# Patient Record
Sex: FEMALE | Race: BLACK OR AFRICAN AMERICAN | NOT HISPANIC OR LATINO
[De-identification: names, ages, dates, MRNs, and addresses within clinical notes are randomized per-mention and may not be internally consistent; named-entity substitution may affect disease eponyms.]

---

## 2018-08-15 ENCOUNTER — NON-APPOINTMENT (OUTPATIENT)
Age: 33
End: 2018-08-15

## 2018-08-15 ENCOUNTER — APPOINTMENT (OUTPATIENT)
Dept: OBGYN | Facility: CLINIC | Age: 33
End: 2018-08-15
Payer: MEDICAID

## 2018-08-15 VITALS
BODY MASS INDEX: 43.57 KG/M2 | HEART RATE: 85 BPM | HEIGHT: 64 IN | WEIGHT: 255.19 LBS | DIASTOLIC BLOOD PRESSURE: 92 MMHG | SYSTOLIC BLOOD PRESSURE: 135 MMHG

## 2018-08-15 DIAGNOSIS — Z86.69 PERSONAL HISTORY OF OTHER DISEASES OF THE NERVOUS SYSTEM AND SENSE ORGANS: ICD-10-CM

## 2018-08-15 DIAGNOSIS — J45.909 UNSPECIFIED ASTHMA, UNCOMPLICATED: ICD-10-CM

## 2018-08-15 DIAGNOSIS — M25.472 EFFUSION, RIGHT ANKLE: ICD-10-CM

## 2018-08-15 DIAGNOSIS — Z82.0 FAMILY HISTORY OF EPILEPSY AND OTHER DISEASES OF THE NERVOUS SYSTEM: ICD-10-CM

## 2018-08-15 DIAGNOSIS — M25.471 EFFUSION, RIGHT ANKLE: ICD-10-CM

## 2018-08-15 PROCEDURE — 0501F PRENATAL FLOW SHEET: CPT

## 2018-08-15 RX ORDER — PRENATAL VIT NO.130/IRON/FOLIC 27MG-0.8MG
28-0.8 TABLET ORAL DAILY
Qty: 90 | Refills: 2 | Status: DISCONTINUED | COMMUNITY
Start: 2018-08-15 | End: 2018-08-15

## 2018-08-16 LAB
ABO + RH PNL BLD: NORMAL
B19V IGG SER QL IA: 8.1 INDEX
B19V IGG+IGM SER-IMP: NORMAL
B19V IGG+IGM SER-IMP: POSITIVE
B19V IGM FLD-ACNC: 0.2 INDEX
B19V IGM SER-ACNC: NEGATIVE
BASOPHILS # BLD AUTO: 0.01 K/UL
BASOPHILS NFR BLD AUTO: 0.1 %
BLD GP AB SCN SERPL QL: NORMAL
C TRACH RRNA SPEC QL NAA+PROBE: NOT DETECTED
EOSINOPHIL # BLD AUTO: 0.15 K/UL
EOSINOPHIL NFR BLD AUTO: 1.8 %
HBA1C MFR BLD HPLC: 5.6 %
HBV SURFACE AG SER QL: NONREACTIVE
HCG SERPL-MCNC: 8348 MIU/ML
HCT VFR BLD CALC: 38 %
HGB BLD-MCNC: 11.7 G/DL
HIV1+2 AB SPEC QL IA.RAPID: NONREACTIVE
HPV HIGH+LOW RISK DNA PNL CVX: NOT DETECTED
IMM GRANULOCYTES NFR BLD AUTO: 0.4 %
LYMPHOCYTES # BLD AUTO: 2.28 K/UL
LYMPHOCYTES NFR BLD AUTO: 27.5 %
MAN DIFF?: NORMAL
MCHC RBC-ENTMCNC: 25.1 PG
MCHC RBC-ENTMCNC: 30.8 GM/DL
MCV RBC AUTO: 81.4 FL
MONOCYTES # BLD AUTO: 0.47 K/UL
MONOCYTES NFR BLD AUTO: 5.7 %
N GONORRHOEA RRNA SPEC QL NAA+PROBE: NOT DETECTED
NEUTROPHILS # BLD AUTO: 5.35 K/UL
NEUTROPHILS NFR BLD AUTO: 64.5 %
PLATELET # BLD AUTO: 363 K/UL
RBC # BLD: 4.67 M/UL
RBC # FLD: 17.8 %
SOURCE AMPLIFICATION: NORMAL
WBC # FLD AUTO: 8.29 K/UL

## 2018-08-17 ENCOUNTER — TRANSCRIPTION ENCOUNTER (OUTPATIENT)
Age: 33
End: 2018-08-17

## 2018-08-17 LAB
BACTERIA UR CULT: NORMAL
HGB A MFR BLD: 97.5 %
HGB A2 MFR BLD: 2.5 %
HGB FRACT BLD-IMP: NORMAL
MEV IGG FLD QL IA: 69.9 AU/ML
MEV IGG+IGM SER-IMP: POSITIVE
MUV AB SER-ACNC: POSITIVE
MUV IGG SER QL IA: 195 AU/ML
RUBV IGG FLD-ACNC: 6.1 INDEX
RUBV IGG SER-IMP: POSITIVE
T PALLIDUM AB SER QL IA: NEGATIVE
VZV AB TITR SER: POSITIVE
VZV IGG SER IF-ACNC: 557.6 INDEX

## 2018-08-20 LAB
CYTOLOGY CVX/VAG DOC THIN PREP: NORMAL
M TB IFN-G BLD-IMP: NEGATIVE
QUANTIFERON TB PLUS MITOGEN MINUS NIL: >10 IU/ML
QUANTIFERON TB PLUS NIL: 0.01 IU/ML
QUANTIFERON TB PLUS TB1 MINUS NIL: 0 IU/ML
QUANTIFERON TB PLUS TB2 MINUS NIL: 0 IU/ML

## 2018-08-22 ENCOUNTER — NON-APPOINTMENT (OUTPATIENT)
Age: 33
End: 2018-08-22

## 2018-08-22 ENCOUNTER — APPOINTMENT (OUTPATIENT)
Dept: OBGYN | Facility: CLINIC | Age: 33
End: 2018-08-22
Payer: MEDICAID

## 2018-08-22 VITALS
HEART RATE: 82 BPM | DIASTOLIC BLOOD PRESSURE: 92 MMHG | WEIGHT: 254.25 LBS | SYSTOLIC BLOOD PRESSURE: 149 MMHG | BODY MASS INDEX: 43.64 KG/M2

## 2018-08-22 LAB — FMR1 GENE MUT ANL BLD/T: NORMAL

## 2018-08-22 PROCEDURE — 0502F SUBSEQUENT PRENATAL CARE: CPT

## 2018-08-23 LAB
AR GENE MUT ANL BLD/T: NEGATIVE
CFTR MUT TESTED BLD/T: NEGATIVE

## 2018-08-24 ENCOUNTER — APPOINTMENT (OUTPATIENT)
Dept: ANTEPARTUM | Facility: CLINIC | Age: 33
End: 2018-08-24

## 2018-08-24 ENCOUNTER — APPOINTMENT (OUTPATIENT)
Dept: MATERNAL FETAL MEDICINE | Facility: CLINIC | Age: 33
End: 2018-08-24
Payer: MEDICAID

## 2018-08-24 ENCOUNTER — ASOB RESULT (OUTPATIENT)
Age: 33
End: 2018-08-24

## 2018-08-24 PROCEDURE — 99214 OFFICE O/P EST MOD 30 MIN: CPT

## 2018-08-29 ENCOUNTER — APPOINTMENT (OUTPATIENT)
Dept: OBGYN | Facility: CLINIC | Age: 33
End: 2018-08-29
Payer: MEDICAID

## 2018-08-29 ENCOUNTER — ASOB RESULT (OUTPATIENT)
Age: 33
End: 2018-08-29

## 2018-08-29 ENCOUNTER — NON-APPOINTMENT (OUTPATIENT)
Age: 33
End: 2018-08-29

## 2018-08-29 ENCOUNTER — APPOINTMENT (OUTPATIENT)
Dept: ANTEPARTUM | Facility: CLINIC | Age: 33
End: 2018-08-29
Payer: MEDICAID

## 2018-08-29 VITALS
SYSTOLIC BLOOD PRESSURE: 140 MMHG | HEIGHT: 64 IN | WEIGHT: 255 LBS | DIASTOLIC BLOOD PRESSURE: 92 MMHG | HEART RATE: 81 BPM | BODY MASS INDEX: 43.54 KG/M2

## 2018-08-29 DIAGNOSIS — Z3A.01 LESS THAN 8 WEEKS GESTATION OF PREGNANCY: ICD-10-CM

## 2018-08-29 DIAGNOSIS — Z3A.08 8 WEEKS GESTATION OF PREGNANCY: ICD-10-CM

## 2018-08-29 PROCEDURE — 76801 OB US < 14 WKS SINGLE FETUS: CPT

## 2018-08-29 PROCEDURE — 76817 TRANSVAGINAL US OBSTETRIC: CPT

## 2018-08-29 PROCEDURE — 0502F SUBSEQUENT PRENATAL CARE: CPT

## 2018-08-30 ENCOUNTER — APPOINTMENT (OUTPATIENT)
Dept: OBGYN | Facility: CLINIC | Age: 33
End: 2018-08-30
Payer: MEDICAID

## 2018-08-30 VITALS
SYSTOLIC BLOOD PRESSURE: 141 MMHG | WEIGHT: 253.13 LBS | HEART RATE: 74 BPM | DIASTOLIC BLOOD PRESSURE: 96 MMHG | BODY MASS INDEX: 43.45 KG/M2

## 2018-08-30 PROCEDURE — 99213 OFFICE O/P EST LOW 20 MIN: CPT

## 2018-09-03 ENCOUNTER — TRANSCRIPTION ENCOUNTER (OUTPATIENT)
Age: 33
End: 2018-09-03

## 2018-09-04 ENCOUNTER — APPOINTMENT (OUTPATIENT)
Dept: OBGYN | Facility: HOSPITAL | Age: 33
End: 2018-09-04

## 2018-09-04 ENCOUNTER — RESULT REVIEW (OUTPATIENT)
Age: 33
End: 2018-09-04

## 2018-09-04 ENCOUNTER — OUTPATIENT (OUTPATIENT)
Dept: OUTPATIENT SERVICES | Facility: HOSPITAL | Age: 33
LOS: 1 days | End: 2018-09-04
Payer: MEDICAID

## 2018-09-04 VITALS
HEIGHT: 64 IN | HEART RATE: 69 BPM | DIASTOLIC BLOOD PRESSURE: 86 MMHG | OXYGEN SATURATION: 100 % | TEMPERATURE: 97 F | WEIGHT: 253.09 LBS | SYSTOLIC BLOOD PRESSURE: 148 MMHG | RESPIRATION RATE: 18 BRPM

## 2018-09-04 VITALS
OXYGEN SATURATION: 98 % | RESPIRATION RATE: 18 BRPM | TEMPERATURE: 97 F | DIASTOLIC BLOOD PRESSURE: 88 MMHG | SYSTOLIC BLOOD PRESSURE: 132 MMHG | HEART RATE: 82 BPM

## 2018-09-04 VITALS
SYSTOLIC BLOOD PRESSURE: 140 MMHG | RESPIRATION RATE: 16 BRPM | HEART RATE: 80 BPM | TEMPERATURE: 99 F | DIASTOLIC BLOOD PRESSURE: 94 MMHG

## 2018-09-04 DIAGNOSIS — Z01.818 ENCOUNTER FOR OTHER PREPROCEDURAL EXAMINATION: ICD-10-CM

## 2018-09-04 DIAGNOSIS — O02.1 MISSED ABORTION: ICD-10-CM

## 2018-09-04 DIAGNOSIS — Z29.9 ENCOUNTER FOR PROPHYLACTIC MEASURES, UNSPECIFIED: ICD-10-CM

## 2018-09-04 DIAGNOSIS — Z98.891 HISTORY OF UTERINE SCAR FROM PREVIOUS SURGERY: Chronic | ICD-10-CM

## 2018-09-04 DIAGNOSIS — Z87.39 PERSONAL HISTORY OF OTHER DISEASES OF THE MUSCULOSKELETAL SYSTEM AND CONNECTIVE TISSUE: Chronic | ICD-10-CM

## 2018-09-04 DIAGNOSIS — I10 ESSENTIAL (PRIMARY) HYPERTENSION: ICD-10-CM

## 2018-09-04 DIAGNOSIS — Z98.890 OTHER SPECIFIED POSTPROCEDURAL STATES: Chronic | ICD-10-CM

## 2018-09-04 LAB
ANION GAP SERPL CALC-SCNC: 10 MMOL/L — SIGNIFICANT CHANGE UP (ref 5–17)
BASOPHILS # BLD AUTO: 0 K/UL — SIGNIFICANT CHANGE UP (ref 0–0.2)
BASOPHILS NFR BLD AUTO: 0.2 % — SIGNIFICANT CHANGE UP (ref 0–2)
BLD GP AB SCN SERPL QL: SIGNIFICANT CHANGE UP
BUN SERPL-MCNC: 8 MG/DL — SIGNIFICANT CHANGE UP (ref 8–20)
CALCIUM SERPL-MCNC: 9 MG/DL — SIGNIFICANT CHANGE UP (ref 8.6–10.2)
CHLORIDE SERPL-SCNC: 102 MMOL/L — SIGNIFICANT CHANGE UP (ref 98–107)
CO2 SERPL-SCNC: 27 MMOL/L — SIGNIFICANT CHANGE UP (ref 22–29)
CREAT SERPL-MCNC: 0.74 MG/DL — SIGNIFICANT CHANGE UP (ref 0.5–1.3)
EOSINOPHIL # BLD AUTO: 0.2 K/UL — SIGNIFICANT CHANGE UP (ref 0–0.5)
EOSINOPHIL NFR BLD AUTO: 2.7 % — SIGNIFICANT CHANGE UP (ref 0–6)
GLUCOSE SERPL-MCNC: 87 MG/DL — SIGNIFICANT CHANGE UP (ref 70–115)
HCG SERPL-ACNC: 2098 MIU/ML — SIGNIFICANT CHANGE UP
HCT VFR BLD CALC: 36.2 % — LOW (ref 37–47)
HGB BLD-MCNC: 11.7 G/DL — LOW (ref 12–16)
LYMPHOCYTES # BLD AUTO: 1.7 K/UL — SIGNIFICANT CHANGE UP (ref 1–4.8)
LYMPHOCYTES # BLD AUTO: 30.4 % — SIGNIFICANT CHANGE UP (ref 20–55)
MCHC RBC-ENTMCNC: 25.9 PG — LOW (ref 27–31)
MCHC RBC-ENTMCNC: 32.3 G/DL — SIGNIFICANT CHANGE UP (ref 32–36)
MCV RBC AUTO: 80.3 FL — LOW (ref 81–99)
MONOCYTES # BLD AUTO: 0.4 K/UL — SIGNIFICANT CHANGE UP (ref 0–0.8)
MONOCYTES NFR BLD AUTO: 6.6 % — SIGNIFICANT CHANGE UP (ref 3–10)
NEUTROPHILS # BLD AUTO: 3.3 K/UL — SIGNIFICANT CHANGE UP (ref 1.8–8)
NEUTROPHILS NFR BLD AUTO: 59.9 % — SIGNIFICANT CHANGE UP (ref 37–73)
PLATELET # BLD AUTO: 281 K/UL — SIGNIFICANT CHANGE UP (ref 150–400)
POTASSIUM SERPL-MCNC: 3.7 MMOL/L — SIGNIFICANT CHANGE UP (ref 3.5–5.3)
POTASSIUM SERPL-SCNC: 3.7 MMOL/L — SIGNIFICANT CHANGE UP (ref 3.5–5.3)
RBC # BLD: 4.51 M/UL — SIGNIFICANT CHANGE UP (ref 4.4–5.2)
RBC # FLD: 16.8 % — HIGH (ref 11–15.6)
SODIUM SERPL-SCNC: 139 MMOL/L — SIGNIFICANT CHANGE UP (ref 135–145)
TYPE + AB SCN PNL BLD: SIGNIFICANT CHANGE UP
WBC # BLD: 5.5 K/UL — SIGNIFICANT CHANGE UP (ref 4.8–10.8)
WBC # FLD AUTO: 5.5 K/UL — SIGNIFICANT CHANGE UP (ref 4.8–10.8)

## 2018-09-04 PROCEDURE — 80048 BASIC METABOLIC PNL TOTAL CA: CPT

## 2018-09-04 PROCEDURE — 85027 COMPLETE CBC AUTOMATED: CPT

## 2018-09-04 PROCEDURE — 88305 TISSUE EXAM BY PATHOLOGIST: CPT | Mod: 26

## 2018-09-04 PROCEDURE — 59820 CARE OF MISCARRIAGE: CPT

## 2018-09-04 PROCEDURE — 93010 ELECTROCARDIOGRAM REPORT: CPT

## 2018-09-04 PROCEDURE — 36415 COLL VENOUS BLD VENIPUNCTURE: CPT

## 2018-09-04 PROCEDURE — 86901 BLOOD TYPING SEROLOGIC RH(D): CPT

## 2018-09-04 PROCEDURE — 88305 TISSUE EXAM BY PATHOLOGIST: CPT

## 2018-09-04 PROCEDURE — 93005 ELECTROCARDIOGRAM TRACING: CPT

## 2018-09-04 PROCEDURE — 86850 RBC ANTIBODY SCREEN: CPT

## 2018-09-04 PROCEDURE — 84702 CHORIONIC GONADOTROPIN TEST: CPT

## 2018-09-04 PROCEDURE — 86900 BLOOD TYPING SEROLOGIC ABO: CPT

## 2018-09-04 PROCEDURE — G0463: CPT

## 2018-09-04 RX ORDER — SODIUM CHLORIDE 9 MG/ML
1000 INJECTION, SOLUTION INTRAVENOUS
Qty: 0 | Refills: 0 | Status: DISCONTINUED | OUTPATIENT
Start: 2018-09-04 | End: 2018-09-04

## 2018-09-04 RX ORDER — ONDANSETRON 8 MG/1
4 TABLET, FILM COATED ORAL ONCE
Qty: 0 | Refills: 0 | Status: DISCONTINUED | OUTPATIENT
Start: 2018-09-04 | End: 2018-09-04

## 2018-09-04 RX ORDER — FENTANYL CITRATE 50 UG/ML
50 INJECTION INTRAVENOUS
Qty: 0 | Refills: 0 | Status: DISCONTINUED | OUTPATIENT
Start: 2018-09-04 | End: 2018-09-04

## 2018-09-04 RX ORDER — SODIUM CHLORIDE 9 MG/ML
3 INJECTION INTRAMUSCULAR; INTRAVENOUS; SUBCUTANEOUS EVERY 8 HOURS
Qty: 0 | Refills: 0 | Status: DISCONTINUED | OUTPATIENT
Start: 2018-09-04 | End: 2018-09-04

## 2018-09-04 RX ORDER — FENTANYL CITRATE 50 UG/ML
50 INJECTION INTRAVENOUS ONCE
Qty: 0 | Refills: 0 | Status: DISCONTINUED | OUTPATIENT
Start: 2018-09-04 | End: 2018-09-04

## 2018-09-04 RX ORDER — OXYCODONE HYDROCHLORIDE 5 MG/1
5 TABLET ORAL ONCE
Qty: 0 | Refills: 0 | Status: DISCONTINUED | OUTPATIENT
Start: 2018-09-04 | End: 2018-09-04

## 2018-09-04 RX ADMIN — FENTANYL CITRATE 50 MICROGRAM(S): 50 INJECTION INTRAVENOUS at 14:04

## 2018-09-04 NOTE — H&P PST ADULT - PROBLEM SELECTOR PLAN 2
pt states mild htn since pregnancy denies prior history  d'cd labetalol on own when spotting started  denies chest pain or headache at present time

## 2018-09-04 NOTE — BRIEF OPERATIVE NOTE - PROCEDURE
<<-----Click on this checkbox to enter Procedure Dilation and curettage of uterus using suction  09/04/2018    Active  BRANDI

## 2018-09-04 NOTE — ASU PATIENT PROFILE, ADULT - LEARNING ASSESSMENT (PATIENT) ADDITIONAL COMMENTS
Patient verbalized understanding of all instructions. Procedure is today, 9/4/18. NPO since 2100 9/3/18

## 2018-09-04 NOTE — ASU DISCHARGE PLAN (ADULT/PEDIATRIC). - MEDICATION SUMMARY - MEDICATIONS TO TAKE
I will START or STAY ON the medications listed below when I get home from the hospital:    furosemide 20 mg oral tablet  -- 1 tab(s) by mouth once a day, As Needed  -- Indication: For Missed

## 2018-09-04 NOTE — ASU DISCHARGE PLAN (ADULT/PEDIATRIC). - ACTIVITY LEVEL
no tub baths/no douching/nothing per vagina/elevate extremity/no intercourse/no heavy lifting/quiet play/no sports/gym/no exercise/no weight bearing/weight bearing as tolerated/nothing per rectum/no tampons

## 2018-09-04 NOTE — H&P PST ADULT - PSH
H/O dilation and curettage    History of  section  X3 , , 2013 H/O dilation and curettage    History of  section  X3 , , 2013  History of knee problem  right arthroscopy

## 2018-09-04 NOTE — H&P PST ADULT - HISTORY OF PRESENT ILLNESS
33 year old female presents with c/o vaginal bleeding , was approx 8 weeks pregnant and started spotting   sono done that day and heart beat noted , repeat sono on  showed no heart beat and pt has been bleeding heavier with clotts since 18. . Pt has had mild htn over past 2 weeks and was placed  on labetolol was discontinued by pt when bleeding started and  lasix was started as needed .Pt to follow up with DR. Anthony regarding htn , denies any chest pain or headaches. Scheduled for D&C pt has 3 children at home. Has had 2 miscarriages in past . 33 year old female presents with c/o vaginal bleeding , was approx 8 weeks pregnant and started spotting   sono done that day and heart beat noted , repeat sono on  showed no heart beat and pt has been bleeding heavier with clotts since 18. . Pt has had mild htn over past 2 weeks and was placed  on labetolol from DR. Romano and was discontinued by pt when bleeding started and  lasix was prescribed by pmd as needed for fluid retention  .Pt to follow up with DR. Anthony regarding htn , denies any chest pain or headaches. Scheduled for D&C pt has 3 children at home. Has had 2 miscarriages in past .

## 2018-09-04 NOTE — H&P PST ADULT - ASSESSMENT
33 year old female in [facility] presents for D&C s/p spotting and sono neg for fetal heart rate. Approx 8 week s gestation.   CAPRINI SCORE [CLOT]    AGE RELATED RISK FACTORS                                                       MOBILITY RELATED FACTORS  [ ] Age 41-60 years                                            (1 Point)                  [ ] Bed rest                                                        (1 Point)  [ ] Age: 61-74 years                                           (2 Points)                 [ ] Plaster cast                                                   (2 Points)  [ ] Age= 75 years                                              (3 Points)                 [ ] Bed bound for more than 72 hours                 (2 Points)    DISEASE RELATED RISK FACTORS                                               GENDER SPECIFIC FACTORS  [ X] Edema in the lower extremities                       (1 Point)                  [ ] Pregnancy                                                     (1 Point)  [ ] Varicose veins                                               (1 Point)                  [ ] Post-partum < 6 weeks                                   (1 Point)             [X ] BMI > 25 Kg/m2                                            (1 Point)                  [ ] Hormonal therapy  or oral contraception          (1 Point)                 [ ] Sepsis (in the previous month)                        (1 Point)                  [ ] History of pregnancy complications                 (1 point)  [ ] Pneumonia or serious lung disease                                               [ ] Unexplained or recurrent                     (1 Point)           (in the previous month)                               (1 Point)  [ ] Abnormal pulmonary function test                     (1 Point)                 SURGERY RELATED RISK FACTORS  [ ] Acute myocardial infarction                              (1 Point)                 [ ]  Section                                             (1 Point)  [ ] Congestive heart failure (in the previous month)  (1 Point)               [ X] Minor surgery                                                  (1 Point)   [ ] Inflammatory bowel disease                             (1 Point)                 [ ] Arthroscopic surgery                                        (2 Points)  [ ] Central venous access                                      (2 Points)                [ ] General surgery lasting more than 45 minutes   (2 Points)       [ ] Stroke (in the previous month)                          (5 Points)               [ ] Elective arthroplasty                                         (5 Points)                                                                                                                                               HEMATOLOGY RELATED FACTORS                                                 TRAUMA RELATED RISK FACTORS  [ ] Prior episodes of VTE                                     (3 Points)                 [ ] Fracture of the hip, pelvis, or leg                       (5 Points)  [ ] Positive family history for VTE                         (3 Points)                 [ ] Acute spinal cord injury (in the previous month)  (5 Points)  [ ] Prothrombin 81349 A                                     (3 Points)                 [ ] Paralysis  (less than 1 month)                             (5 Points)  [ ] Factor V Leiden                                             (3 Points)                  [ ] Multiple Trauma within 1 month                        (5 Points)  [ ] Lupus anticoagulants                                     (3 Points)                                                           [ ] Anticardiolipin antibodies                               (3 Points)                                                       [ ] High homocysteine in the blood                      (3 Points)                                             [ ] Other congenital or acquired thrombophilia      (3 Points)                                                [ ] Heparin induced thrombocytopenia                  (3 Points)                                          Total Score [        3  ]

## 2018-09-05 ENCOUNTER — APPOINTMENT (OUTPATIENT)
Dept: OBGYN | Facility: CLINIC | Age: 33
End: 2018-09-05

## 2018-09-06 LAB — SURGICAL PATHOLOGY FINAL REPORT - CH: SIGNIFICANT CHANGE UP

## 2018-09-07 DIAGNOSIS — Z3A.10 10 WEEKS GESTATION OF PREGNANCY: ICD-10-CM

## 2018-09-10 ENCOUNTER — APPOINTMENT (OUTPATIENT)
Dept: ANTEPARTUM | Facility: CLINIC | Age: 33
End: 2018-09-10

## 2018-09-12 ENCOUNTER — APPOINTMENT (OUTPATIENT)
Dept: OBGYN | Facility: CLINIC | Age: 33
End: 2018-09-12

## 2018-09-13 ENCOUNTER — APPOINTMENT (OUTPATIENT)
Dept: OBGYN | Facility: CLINIC | Age: 33
End: 2018-09-13
Payer: MEDICAID

## 2018-09-13 ENCOUNTER — APPOINTMENT (OUTPATIENT)
Dept: OBGYN | Facility: CLINIC | Age: 33
End: 2018-09-13

## 2018-09-13 ENCOUNTER — TRANSCRIPTION ENCOUNTER (OUTPATIENT)
Age: 33
End: 2018-09-13

## 2018-09-13 VITALS
SYSTOLIC BLOOD PRESSURE: 171 MMHG | HEART RATE: 78 BPM | WEIGHT: 252 LBS | HEIGHT: 64 IN | DIASTOLIC BLOOD PRESSURE: 118 MMHG | BODY MASS INDEX: 43.02 KG/M2

## 2018-09-13 PROBLEM — O02.1 MISSED ABORTION: Chronic | Status: ACTIVE | Noted: 2018-09-04

## 2018-09-13 PROBLEM — I10 ESSENTIAL (PRIMARY) HYPERTENSION: Chronic | Status: ACTIVE | Noted: 2018-09-04

## 2018-09-13 PROCEDURE — 99024 POSTOP FOLLOW-UP VISIT: CPT

## 2018-09-22 ENCOUNTER — APPOINTMENT (OUTPATIENT)
Dept: ANTEPARTUM | Facility: CLINIC | Age: 33
End: 2018-09-22

## 2018-11-21 ENCOUNTER — OTHER (OUTPATIENT)
Age: 33
End: 2018-11-21

## 2018-11-29 ENCOUNTER — APPOINTMENT (OUTPATIENT)
Dept: OBGYN | Facility: CLINIC | Age: 33
End: 2018-11-29
Payer: MEDICAID

## 2018-11-29 VITALS
WEIGHT: 258.06 LBS | DIASTOLIC BLOOD PRESSURE: 84 MMHG | BODY MASS INDEX: 44.06 KG/M2 | SYSTOLIC BLOOD PRESSURE: 130 MMHG | HEART RATE: 73 BPM | HEIGHT: 64 IN

## 2018-11-29 PROCEDURE — 99213 OFFICE O/P EST LOW 20 MIN: CPT | Mod: 24

## 2018-12-10 DIAGNOSIS — Z09 ENCOUNTER FOR FOLLOW-UP EXAMINATION AFTER COMPLETED TREATMENT FOR CONDITIONS OTHER THAN MALIGNANT NEOPLASM: ICD-10-CM

## 2018-12-10 DIAGNOSIS — O02.1 MISSED ABORTION: ICD-10-CM

## 2018-12-13 ENCOUNTER — APPOINTMENT (OUTPATIENT)
Dept: OBGYN | Facility: CLINIC | Age: 33
End: 2018-12-13
Payer: MEDICAID

## 2018-12-13 ENCOUNTER — NON-APPOINTMENT (OUTPATIENT)
Age: 33
End: 2018-12-13

## 2018-12-13 VITALS
SYSTOLIC BLOOD PRESSURE: 147 MMHG | BODY MASS INDEX: 45.41 KG/M2 | WEIGHT: 266 LBS | DIASTOLIC BLOOD PRESSURE: 98 MMHG | HEIGHT: 64 IN

## 2018-12-13 DIAGNOSIS — Z83.3 FAMILY HISTORY OF DIABETES MELLITUS: ICD-10-CM

## 2018-12-13 DIAGNOSIS — Z78.9 OTHER SPECIFIED HEALTH STATUS: ICD-10-CM

## 2018-12-13 DIAGNOSIS — Z28.21 IMMUNIZATION NOT CARRIED OUT BECAUSE OF PATIENT REFUSAL: ICD-10-CM

## 2018-12-13 DIAGNOSIS — K80.20 CALCULUS OF GALLBLADDER W/OUT CHOLECYSTITIS W/OUT OBSTRUCTION: ICD-10-CM

## 2018-12-13 PROCEDURE — 0501F PRENATAL FLOW SHEET: CPT

## 2018-12-14 LAB
ABO + RH PNL BLD: NORMAL
B19V IGG SER QL IA: 6.8 INDEX
B19V IGG+IGM SER-IMP: NORMAL
B19V IGG+IGM SER-IMP: POSITIVE
B19V IGM FLD-ACNC: 0.1 INDEX
B19V IGM SER-ACNC: NEGATIVE
BASOPHILS # BLD AUTO: 0.01 K/UL
BASOPHILS NFR BLD AUTO: 0.1 %
BLD GP AB SCN SERPL QL: NORMAL
CMV IGG SERPL QL: 6.9 U/ML
CMV IGG SERPL-IMP: POSITIVE
EOSINOPHIL # BLD AUTO: 0.08 K/UL
EOSINOPHIL NFR BLD AUTO: 1.1 %
HBV SURFACE AG SER QL: NONREACTIVE
HCT VFR BLD CALC: 33.9 %
HCV AB SER QL: NONREACTIVE
HCV S/CO RATIO: 0.24 S/CO
HGB BLD-MCNC: 11.1 G/DL
HIV1+2 AB SPEC QL IA.RAPID: NONREACTIVE
IMM GRANULOCYTES NFR BLD AUTO: 0.1 %
LYMPHOCYTES # BLD AUTO: 1.62 K/UL
LYMPHOCYTES NFR BLD AUTO: 22.5 %
MAN DIFF?: NORMAL
MCHC RBC-ENTMCNC: 26.6 PG
MCHC RBC-ENTMCNC: 32.7 GM/DL
MCV RBC AUTO: 81.1 FL
MEV IGG FLD QL IA: 52.8 AU/ML
MEV IGG+IGM SER-IMP: POSITIVE
MONOCYTES # BLD AUTO: 0.6 K/UL
MONOCYTES NFR BLD AUTO: 8.3 %
NEUTROPHILS # BLD AUTO: 4.89 K/UL
NEUTROPHILS NFR BLD AUTO: 67.9 %
PLATELET # BLD AUTO: 307 K/UL
RBC # BLD: 4.18 M/UL
RBC # FLD: 17.6 %
RUBV IGG FLD-ACNC: 3.8 INDEX
RUBV IGG SER-IMP: POSITIVE
T GONDII AB SER-IMP: NEGATIVE
T GONDII IGG SER QL: <3 IU/ML
T PALLIDUM AB SER QL IA: NEGATIVE
T4 SERPL-MCNC: 11.3 UG/DL
TSH SERPL-ACNC: 1.55 UIU/ML
VZV AB TITR SER: POSITIVE
VZV IGG SER IF-ACNC: 584.8 INDEX
WBC # FLD AUTO: 7.21 K/UL

## 2018-12-15 LAB — MEV IGM SER QL: NEGATIVE

## 2018-12-17 LAB
HGB A MFR BLD: 97.5 %
HGB A2 MFR BLD: 2.5 %
HGB FRACT BLD-IMP: NORMAL
M TB IFN-G BLD-IMP: NEGATIVE
QUANTIFERON TB PLUS MITOGEN MINUS NIL: 8.07 IU/ML
QUANTIFERON TB PLUS NIL: 0.02 IU/ML
QUANTIFERON TB PLUS TB1 MINUS NIL: 0 IU/ML
QUANTIFERON TB PLUS TB2 MINUS NIL: 0 IU/ML

## 2018-12-19 LAB
AR GENE MUT ANL BLD/T: NEGATIVE
CFTR MUT TESTED BLD/T: NEGATIVE

## 2018-12-21 LAB — FMR1 GENE MUT ANL BLD/T: NORMAL

## 2018-12-28 ENCOUNTER — APPOINTMENT (OUTPATIENT)
Dept: OBGYN | Facility: CLINIC | Age: 33
End: 2018-12-28
Payer: MEDICAID

## 2018-12-28 ENCOUNTER — NON-APPOINTMENT (OUTPATIENT)
Age: 33
End: 2018-12-28

## 2018-12-28 VITALS
HEART RATE: 81 BPM | BODY MASS INDEX: 43.54 KG/M2 | SYSTOLIC BLOOD PRESSURE: 115 MMHG | WEIGHT: 255 LBS | DIASTOLIC BLOOD PRESSURE: 80 MMHG | HEIGHT: 64 IN

## 2018-12-28 DIAGNOSIS — Z3A.10 10 WEEKS GESTATION OF PREGNANCY: ICD-10-CM

## 2018-12-28 PROCEDURE — 0502F SUBSEQUENT PRENATAL CARE: CPT

## 2018-12-28 RX ORDER — FERROUS FUMARATE/ASCORBIC ACID 65MG-25 MG
65-25 TABLET, EXTENDED RELEASE ORAL
Qty: 3 | Refills: 3 | Status: ACTIVE | COMMUNITY
Start: 2018-12-28 | End: 1900-01-01

## 2019-01-02 ENCOUNTER — RX RENEWAL (OUTPATIENT)
Age: 34
End: 2019-01-02

## 2019-01-08 ENCOUNTER — APPOINTMENT (OUTPATIENT)
Dept: ANTEPARTUM | Facility: CLINIC | Age: 34
End: 2019-01-08

## 2019-01-10 ENCOUNTER — ASOB RESULT (OUTPATIENT)
Age: 34
End: 2019-01-10

## 2019-01-10 ENCOUNTER — APPOINTMENT (OUTPATIENT)
Dept: ANTEPARTUM | Facility: CLINIC | Age: 34
End: 2019-01-10
Payer: MEDICAID

## 2019-01-10 ENCOUNTER — APPOINTMENT (OUTPATIENT)
Dept: MATERNAL FETAL MEDICINE | Facility: CLINIC | Age: 34
End: 2019-01-10
Payer: MEDICAID

## 2019-01-10 VITALS
SYSTOLIC BLOOD PRESSURE: 132 MMHG | DIASTOLIC BLOOD PRESSURE: 90 MMHG | BODY MASS INDEX: 43.54 KG/M2 | HEIGHT: 64 IN | WEIGHT: 255 LBS

## 2019-01-10 PROCEDURE — 76813 OB US NUCHAL MEAS 1 GEST: CPT

## 2019-01-10 PROCEDURE — 36416 COLLJ CAPILLARY BLOOD SPEC: CPT

## 2019-01-10 PROCEDURE — 99205 OFFICE O/P NEW HI 60 MIN: CPT

## 2019-01-10 NOTE — DISCUSSION/SUMMARY
[FreeTextEntry1] : The patient is a 34-year-old  being seen at approximately 13 weeks for advanced maternal age, chronic hypertension, maternal obesity, anemia and recurrent pregnancy loss. Patient's obstetrical history is significant for delivery in  of liveborn male  weighing 7 lbs. 8 oz. delivered at term by  section for nonreassuring fetal heart rate tracing. Subsequent pregnancies in  at  both full term repeat elective  sections, liveborn female neonates weighing 7 lbs. 6 oz. and 8 lbs. 0 oz. No problems or complications were noted with either of those pregnancies. In addition the patient has had 3 first trimester miscarriages requiring D&C.\par \par An ultrascreen evaluation was performed today which did reveal a single viable intrauterine gestation with size consistent with dates. No abnormalities were noted and nuchal translucency was found to be within normal limits. Ultrascreen bloods were drawn for evaluation. Vital signs today reveal blood pressure 132/90 and maternal weight is 255 pounds which is a 0 pound weight gain from the previous evaluation done on . This is consistent with a BMI of 43.77KG.\par \par The patient is currently on labetalol 300 mg twice a day as well as prenatal vitamins and iron therapy. Management of the ongoing pregnancy was discussed. At this time the patient will continue on labetalol twice a day. Baseline blood work and 24-hour urine collection should be performed at approximately 20 weeks. Of note the father of this pregnancy is the same father as previous 3 pregnancies and therefore the possibility of superimposed preeclampsia decreases. The patient should be watched carefully during the third trimester for possible superimposed preeclampsia. Growth scans on a monthly basis beginning at approximately 24 weeks will be recommended and  testing weekly basis at about 32 weeks will also be recommended. The patient has an appointment for genetic counseling. All of the above was discussed with the patient and all of her questions were answered.\par \par Her family history is significant for mother having both hypertensive disease as well as diabetes. Her past medical history is significant for hypertension diagnosed in 2018. Her past surgical history is significant for 3 D&Cs, 3  sections, a laparoscopic cholecystectomy and a right knee arthroscopy. She is allergic to Minocin. She denies alcohol, tobacco or drug use.\par \par Recommendations;\par \par #1. Continue labetalol 300 mg twice a day.\par #2. Baseline blood work and 24 urine collection at approximately 20 weeks is recommended.\par #3. Three-hour glucose tolerance test between 24 and 28 weeks is recommended.\par #4. Comprehensive ultrasound and 7 weeks is recommended.\par #5. Followup maternal fetal medicine consultation and 7 weeks is also recommended.

## 2019-01-10 NOTE — LETTER CLOSING
[Thank you very much for allowing me to participate in the care of this patient.] : Thank you very much for allowing me to participate in the care of this patient [If you have any questions, please do not hesitate to contact our office.] : If you have any questions, please do not hesitate to contact our office. [Sincerely,] : Sincerely,

## 2019-01-10 NOTE — LETTER GREETING
[Dear  ___] : Dear  [unfilled], [I had the pleasure of seeing your patient today.] : I had the pleasure of seeing your patient today

## 2019-01-16 LAB
1ST TRIMESTER DATA: NORMAL
ADDENDUM DOC: NORMAL
AFP PNL SERPL: NORMAL
AFP SERPL-ACNC: NORMAL
CLINICAL BIOCHEMIST REVIEW: NORMAL
FREE BETA HCG 1ST TRIMESTER: NORMAL
Lab: NORMAL
NOTES NTD: NORMAL
NT: NORMAL
PAPP-A SERPL-ACNC: NORMAL
TRISOMY 18/3: NORMAL

## 2019-01-24 ENCOUNTER — NON-APPOINTMENT (OUTPATIENT)
Age: 34
End: 2019-01-24

## 2019-01-24 ENCOUNTER — APPOINTMENT (OUTPATIENT)
Dept: OBGYN | Facility: CLINIC | Age: 34
End: 2019-01-24
Payer: MEDICAID

## 2019-01-24 VITALS
DIASTOLIC BLOOD PRESSURE: 77 MMHG | BODY MASS INDEX: 44.22 KG/M2 | SYSTOLIC BLOOD PRESSURE: 116 MMHG | HEIGHT: 64 IN | WEIGHT: 259 LBS | HEART RATE: 81 BPM

## 2019-01-24 PROCEDURE — 0502F SUBSEQUENT PRENATAL CARE: CPT

## 2019-02-11 ENCOUNTER — ASOB RESULT (OUTPATIENT)
Age: 34
End: 2019-02-11

## 2019-02-11 ENCOUNTER — APPOINTMENT (OUTPATIENT)
Dept: OBGYN | Facility: CLINIC | Age: 34
End: 2019-02-11
Payer: MEDICAID

## 2019-02-11 PROCEDURE — 76816 OB US FOLLOW-UP PER FETUS: CPT

## 2019-02-12 PROBLEM — Z3A.13 13 WEEKS GESTATION OF PREGNANCY: Status: RESOLVED | Noted: 2018-12-28 | Resolved: 2019-02-12

## 2019-02-12 PROBLEM — Z3A.15 15 WEEKS GESTATION OF PREGNANCY: Status: RESOLVED | Noted: 2019-01-24 | Resolved: 2019-02-12

## 2019-02-13 ENCOUNTER — APPOINTMENT (OUTPATIENT)
Dept: OBGYN | Facility: CLINIC | Age: 34
End: 2019-02-13
Payer: MEDICAID

## 2019-02-13 DIAGNOSIS — Z3A.13 13 WEEKS GESTATION OF PREGNANCY: ICD-10-CM

## 2019-02-13 DIAGNOSIS — Z3A.15 15 WEEKS GESTATION OF PREGNANCY: ICD-10-CM

## 2019-02-21 ENCOUNTER — OTHER (OUTPATIENT)
Age: 34
End: 2019-02-21

## 2019-02-21 ENCOUNTER — APPOINTMENT (OUTPATIENT)
Dept: OBGYN | Facility: CLINIC | Age: 34
End: 2019-02-21
Payer: MEDICAID

## 2019-02-21 ENCOUNTER — NON-APPOINTMENT (OUTPATIENT)
Age: 34
End: 2019-02-21

## 2019-02-21 VITALS
DIASTOLIC BLOOD PRESSURE: 85 MMHG | WEIGHT: 262.06 LBS | SYSTOLIC BLOOD PRESSURE: 124 MMHG | BODY MASS INDEX: 44.74 KG/M2 | HEIGHT: 64 IN | HEART RATE: 85 BPM

## 2019-02-21 DIAGNOSIS — Z3A.19 19 WEEKS GESTATION OF PREGNANCY: ICD-10-CM

## 2019-02-21 PROCEDURE — 0502F SUBSEQUENT PRENATAL CARE: CPT

## 2019-02-28 ENCOUNTER — APPOINTMENT (OUTPATIENT)
Dept: ANTEPARTUM | Facility: CLINIC | Age: 34
End: 2019-02-28
Payer: MEDICAID

## 2019-02-28 ENCOUNTER — ASOB RESULT (OUTPATIENT)
Age: 34
End: 2019-02-28

## 2019-02-28 ENCOUNTER — APPOINTMENT (OUTPATIENT)
Dept: MATERNAL FETAL MEDICINE | Facility: CLINIC | Age: 34
End: 2019-02-28
Payer: MEDICAID

## 2019-02-28 VITALS
WEIGHT: 264 LBS | HEIGHT: 64 IN | BODY MASS INDEX: 45.07 KG/M2 | SYSTOLIC BLOOD PRESSURE: 124 MMHG | DIASTOLIC BLOOD PRESSURE: 80 MMHG

## 2019-02-28 PROCEDURE — 76811 OB US DETAILED SNGL FETUS: CPT

## 2019-02-28 PROCEDURE — 76817 TRANSVAGINAL US OBSTETRIC: CPT

## 2019-02-28 PROCEDURE — 99215 OFFICE O/P EST HI 40 MIN: CPT

## 2019-02-28 RX ORDER — LABETALOL HYDROCHLORIDE 200 MG/1
200 TABLET, FILM COATED ORAL 3 TIMES DAILY
Qty: 60 | Refills: 3 | Status: DISCONTINUED | COMMUNITY
Start: 2019-02-21 | End: 2019-02-28

## 2019-02-28 RX ORDER — LABETALOL HYDROCHLORIDE 200 MG/1
200 TABLET, FILM COATED ORAL
Qty: 60 | Refills: 5 | Status: ACTIVE | COMMUNITY
Start: 2018-12-13

## 2019-02-28 NOTE — DISCUSSION/SUMMARY
[FreeTextEntry1] : Please see the previous consultation for the patient's medical and obstetrical history. \par \par Kasey was advised to decreased to 200 mg of labetalol twice a day. A comprehensive ultrasound was performed today which does reveal a single viable intrauterine gestation with size consistent with dates. No gross or soft markers associated with fetal aneuploidy are noted. A posterior complete placenta previa is seen and there is suspicion for placenta accreta. Vital signs today reveal a blood pressure 124/80 and maternal weight is 264 pounds which is a 2 pound weight gain from the previous evaluation done on February 21st. This is consistent with a BMI of 45.32KG.\par \par Management of the pregnancy was discussed. Baseline hypertensive blood work was drawn in our office today as well as a sequential blood test. Patient was given a container for 24-hour urine collection. In addition due to the suspicion for placenta accreta a pelvic MRI was ordered. Possible problems and complications related to this and treatmennt were discussed and we will followup with the patient once the pelvic MRI results are available. Patient will return on a monthly basis for growth scans. In addition due to her BMI greater than 40 a 3 hour GTT is recommended between 24 and 28 weeks. All of the above was discussed with the patient and all her questions were answered. \par \par Recommendations;\par \par #1. Continue labetalol 200 mg twice a day.\par #2. Baseline blood work and 24-hour urine collection were obtained in our office today.\par #3. Sequential blood tests was drawn office today.\par #4. Pelvic MRI to rule out placenta accreta was ordered.\par #5. Growth scan in one month was recommended.\par #6. Followup maternal-fetal medicine consultation one month is recommended.

## 2019-03-01 ENCOUNTER — TRANSCRIPTION ENCOUNTER (OUTPATIENT)
Age: 34
End: 2019-03-01

## 2019-03-05 LAB
1ST TRIMESTER DATA: NORMAL
2ND TRIMESTER DATA: NORMAL
AFP PNL SERPL: NORMAL
AFP SERPL-ACNC: NORMAL
AFP SERPL-ACNC: NORMAL
B-HCG FREE SERPL-MCNC: NORMAL
CLINICAL BIOCHEMIST REVIEW: NORMAL
FREE BETA HCG 1ST TRIMESTER: NORMAL
INHIBIN A SERPL-MCNC: NORMAL
NOTES NTD: NORMAL
NT: NORMAL
PAPP-A SERPL-ACNC: NORMAL
U ESTRIOL SERPL-SCNC: NORMAL

## 2019-03-12 PROBLEM — Z3A.19 19 WEEKS GESTATION OF PREGNANCY: Status: RESOLVED | Noted: 2019-02-21 | Resolved: 2019-03-12

## 2019-03-13 ENCOUNTER — APPOINTMENT (OUTPATIENT)
Dept: OBGYN | Facility: CLINIC | Age: 34
End: 2019-03-13
Payer: MEDICAID

## 2019-03-13 ENCOUNTER — NON-APPOINTMENT (OUTPATIENT)
Age: 34
End: 2019-03-13

## 2019-03-13 VITALS
DIASTOLIC BLOOD PRESSURE: 66 MMHG | BODY MASS INDEX: 45.07 KG/M2 | HEART RATE: 78 BPM | WEIGHT: 264 LBS | SYSTOLIC BLOOD PRESSURE: 101 MMHG | HEIGHT: 64 IN

## 2019-03-13 DIAGNOSIS — Z3A.19 19 WEEKS GESTATION OF PREGNANCY: ICD-10-CM

## 2019-03-13 PROCEDURE — 0502F SUBSEQUENT PRENATAL CARE: CPT

## 2019-03-15 ENCOUNTER — OTHER (OUTPATIENT)
Age: 34
End: 2019-03-15

## 2019-03-21 ENCOUNTER — OUTPATIENT (OUTPATIENT)
Dept: OUTPATIENT SERVICES | Facility: HOSPITAL | Age: 34
LOS: 1 days | End: 2019-03-21

## 2019-03-21 ENCOUNTER — APPOINTMENT (OUTPATIENT)
Dept: MRI IMAGING | Facility: CLINIC | Age: 34
End: 2019-03-21
Payer: MEDICAID

## 2019-03-21 DIAGNOSIS — Z98.890 OTHER SPECIFIED POSTPROCEDURAL STATES: Chronic | ICD-10-CM

## 2019-03-21 DIAGNOSIS — Z00.8 ENCOUNTER FOR OTHER GENERAL EXAMINATION: ICD-10-CM

## 2019-03-21 DIAGNOSIS — Z87.39 PERSONAL HISTORY OF OTHER DISEASES OF THE MUSCULOSKELETAL SYSTEM AND CONNECTIVE TISSUE: Chronic | ICD-10-CM

## 2019-03-21 DIAGNOSIS — Z98.891 HISTORY OF UTERINE SCAR FROM PREVIOUS SURGERY: Chronic | ICD-10-CM

## 2019-03-21 PROCEDURE — 72195 MRI PELVIS W/O DYE: CPT | Mod: 26

## 2019-03-28 ENCOUNTER — APPOINTMENT (OUTPATIENT)
Dept: ANTEPARTUM | Facility: CLINIC | Age: 34
End: 2019-03-28
Payer: MEDICAID

## 2019-03-28 ENCOUNTER — ASOB RESULT (OUTPATIENT)
Age: 34
End: 2019-03-28

## 2019-03-28 ENCOUNTER — APPOINTMENT (OUTPATIENT)
Dept: MATERNAL FETAL MEDICINE | Facility: CLINIC | Age: 34
End: 2019-03-28
Payer: MEDICAID

## 2019-03-28 VITALS
BODY MASS INDEX: 44.93 KG/M2 | SYSTOLIC BLOOD PRESSURE: 110 MMHG | WEIGHT: 263.19 LBS | DIASTOLIC BLOOD PRESSURE: 66 MMHG | HEIGHT: 64 IN | OXYGEN SATURATION: 98 % | RESPIRATION RATE: 18 BRPM | HEART RATE: 71 BPM

## 2019-03-28 PROCEDURE — 99215 OFFICE O/P EST HI 40 MIN: CPT

## 2019-03-28 PROCEDURE — 76816 OB US FOLLOW-UP PER FETUS: CPT

## 2019-03-28 RX ORDER — ONDANSETRON 4 MG/1
4 TABLET ORAL
Qty: 28 | Refills: 2 | Status: DISCONTINUED | COMMUNITY
Start: 2018-11-21 | End: 2019-03-28

## 2019-03-28 RX ORDER — VITAMIN C, CALCIUM, IRON, VITAMIN D3, VITAMIN E, VITAMIN B1, VITAMIN B2, VITAMIN B3, VITAMIN B6, FOLIC ACID, IODINE, ZINC, COPPER, DOCUSATE SODIUM, DOCOSAHEXAENOIC ACID (DHA) 27-1-50 MG
KIT ORAL
Refills: 0 | Status: ACTIVE | COMMUNITY

## 2019-03-28 NOTE — DISCUSSION/SUMMARY
[FreeTextEntry1] : Please see the previous consultation for the patient's medical and obstetrical history. The patient is currently 24 weeks pregnant.\par \par A growth scan was performed today which does reveal a single viable intrauterine gestation with estimated fetal weight at 1 lb. 9 oz. which is consistent with the 39th percentile. Breech presentation with a posterior placenta is seen and the largest GLP is 6.61 cm. Vital signs today reveal a blood pressure of 110/66 and maternal weight is 263.3 pounds which is a 3/4 pound weight loss from the last evaluation done on March 13th. This is consistent with a BMI of 45.18KG.\par \par Patient had the flu and had not done her recommended baseline blood work until today. She will do a 24-hour urine collection next week. She will continue on labetalol 200 mg twice a day. A 3 hour glucose tolerance test is recommended secondary to patient's BMI greater than 40. An MRI was performed and has ruled out placenta previa and or placenta accreta. All of the above was discussed with the patient and all of her questions were answered.\par \par Recommendations;\par \par #1. Continue labetalol 20 mg p.o. BID.\par #2. Three-hour glucose tolerance test is recommended.\par #3. Growth scan in one month is recommended.\par #4. Followup maternal fetal medicine consultation one month as recommended.

## 2019-03-29 LAB
ALBUMIN SERPL ELPH-MCNC: 3.4 G/DL
ALP BLD-CCNC: 79 U/L
ALT SERPL-CCNC: 29 U/L
AST SERPL-CCNC: 29 U/L
BASOPHILS # BLD AUTO: 0.02 K/UL
BASOPHILS NFR BLD AUTO: 0.3 %
BILIRUB DIRECT SERPL-MCNC: 0.1 MG/DL
BILIRUB INDIRECT SERPL-MCNC: 0.2 MG/DL
BILIRUB SERPL-MCNC: 0.2 MG/DL
EOSINOPHIL # BLD AUTO: 0.07 K/UL
EOSINOPHIL NFR BLD AUTO: 1.1 %
HCT VFR BLD CALC: 32.5 %
HGB BLD-MCNC: 10.5 G/DL
IMM GRANULOCYTES NFR BLD AUTO: 0.6 %
LYMPHOCYTES # BLD AUTO: 1.32 K/UL
LYMPHOCYTES NFR BLD AUTO: 20.6 %
MAN DIFF?: NORMAL
MCHC RBC-ENTMCNC: 29.4 PG
MCHC RBC-ENTMCNC: 32.3 GM/DL
MCV RBC AUTO: 91 FL
MONOCYTES # BLD AUTO: 0.48 K/UL
MONOCYTES NFR BLD AUTO: 7.5 %
NEUTROPHILS # BLD AUTO: 4.49 K/UL
NEUTROPHILS NFR BLD AUTO: 69.9 %
PLATELET # BLD AUTO: 248 K/UL
PROT SERPL-MCNC: 6.1 G/DL
RBC # BLD: 3.57 M/UL
RBC # FLD: 16.4 %
URATE SERPL-MCNC: 4.4 MG/DL
WBC # FLD AUTO: 6.42 K/UL

## 2019-04-01 ENCOUNTER — APPOINTMENT (OUTPATIENT)
Dept: OBGYN | Facility: CLINIC | Age: 34
End: 2019-04-01
Payer: MEDICAID

## 2019-04-01 ENCOUNTER — NON-APPOINTMENT (OUTPATIENT)
Age: 34
End: 2019-04-01

## 2019-04-01 VITALS
BODY MASS INDEX: 44.76 KG/M2 | HEIGHT: 64 IN | DIASTOLIC BLOOD PRESSURE: 74 MMHG | HEART RATE: 83 BPM | WEIGHT: 262.19 LBS | SYSTOLIC BLOOD PRESSURE: 114 MMHG

## 2019-04-01 PROCEDURE — 0502F SUBSEQUENT PRENATAL CARE: CPT

## 2019-04-01 RX ORDER — TRIAMCINOLONE ACETONIDE 1 MG/G
0.1 CREAM TOPICAL 3 TIMES DAILY
Qty: 1 | Refills: 3 | Status: ACTIVE | COMMUNITY
Start: 2019-04-01 | End: 1900-01-01

## 2019-04-19 DIAGNOSIS — Z3A.22 22 WEEKS GESTATION OF PREGNANCY: ICD-10-CM

## 2019-04-19 DIAGNOSIS — Z3A.25 25 WEEKS GESTATION OF PREGNANCY: ICD-10-CM

## 2019-04-24 ENCOUNTER — NON-APPOINTMENT (OUTPATIENT)
Age: 34
End: 2019-04-24

## 2019-04-24 ENCOUNTER — APPOINTMENT (OUTPATIENT)
Dept: OBGYN | Facility: CLINIC | Age: 34
End: 2019-04-24
Payer: MEDICAID

## 2019-04-24 VITALS
HEIGHT: 64 IN | WEIGHT: 267.5 LBS | DIASTOLIC BLOOD PRESSURE: 80 MMHG | BODY MASS INDEX: 45.67 KG/M2 | HEART RATE: 73 BPM | SYSTOLIC BLOOD PRESSURE: 122 MMHG

## 2019-04-24 PROCEDURE — 0502F SUBSEQUENT PRENATAL CARE: CPT

## 2019-04-25 ENCOUNTER — ASOB RESULT (OUTPATIENT)
Age: 34
End: 2019-04-25

## 2019-04-25 ENCOUNTER — APPOINTMENT (OUTPATIENT)
Dept: ANTEPARTUM | Facility: CLINIC | Age: 34
End: 2019-04-25
Payer: MEDICAID

## 2019-04-25 ENCOUNTER — APPOINTMENT (OUTPATIENT)
Dept: MATERNAL FETAL MEDICINE | Facility: CLINIC | Age: 34
End: 2019-04-25
Payer: MEDICAID

## 2019-04-25 VITALS — DIASTOLIC BLOOD PRESSURE: 82 MMHG | SYSTOLIC BLOOD PRESSURE: 139 MMHG

## 2019-04-25 VITALS — HEART RATE: 78 BPM | BODY MASS INDEX: 45.69 KG/M2 | WEIGHT: 266.2 LBS

## 2019-04-25 DIAGNOSIS — O43.219 PLACENTA ACCRETA, UNSPECIFIED TRIMESTER: ICD-10-CM

## 2019-04-25 DIAGNOSIS — I10 ESSENTIAL (PRIMARY) HYPERTENSION: ICD-10-CM

## 2019-04-25 PROCEDURE — 76816 OB US FOLLOW-UP PER FETUS: CPT

## 2019-04-25 PROCEDURE — 76820 UMBILICAL ARTERY ECHO: CPT

## 2019-04-25 PROCEDURE — 76819 FETAL BIOPHYS PROFIL W/O NST: CPT

## 2019-04-25 PROCEDURE — 93976 VASCULAR STUDY: CPT

## 2019-04-25 PROCEDURE — 99215 OFFICE O/P EST HI 40 MIN: CPT

## 2019-04-25 NOTE — LETTER CLOSING
[Sincerely,] : Sincerely, [Thank you very much for allowing me to participate in the care of this patient.] : Thank you very much for allowing me to participate in the care of this patient [If you have any questions, please do not hesitate to contact our office.] : If you have any questions, please do not hesitate to contact our office.

## 2019-04-25 NOTE — DISCUSSION/SUMMARY
[FreeTextEntry1] : Please see the previous consultation for the patient's medical and obstetrical history. The patient is 28 weeks pregnant and currently on labetalol 200 mg p.o. twice a day.\par \par A growth scan was performed today which does reveal a single viable intrauterine gestation estimated weight 2 lbs. 11 oz. which is consistent with the 26th percentile. Vertex presentation with a posterior placenta was seen and the amniotic fluid index is normal at 14.07 cm. Uterine artery and umbilical artery Doppler studies were performed and were found to be within normal limits. Vital signs today reveal blood pressure of 139/82 and her weight is 266.2 pounds which is a 4 pound weight gain from previous evaluation done on April 1. This is consistent with a BMI of 45.69KG.\par \par Since the last consultation the patient did baseline blood work and CBC, platelet count, liver function tests and uric acid were found to be within normal limits. Patient has not done a 24-hour urine collection yet. She is scheduled for 3 hour glucose tolerance test tomorrow. Patient is taking her blood pressure home irregularly but states that her blood pressures have been normal. No change in her current medical management is indicated. All of the above was discussed with the patient and all of her questions were answered.\par \par Recommendations;\par \par #1. Continue labetalol 200 mg p.o. twice a day.\par #2. Growth scan one month was recommended.\par #3. 3 hour glucose tolerance test this week.\par #4. 24 urine collection as soon as possible.\par #5. MRI has ruled out possible placenta accreta.\par #6. Followup maternal fetal medicine consultation one month was recommended.

## 2019-04-29 LAB
BASOPHILS # BLD AUTO: 0.03 K/UL
BASOPHILS NFR BLD AUTO: 0.5 %
EOSINOPHIL # BLD AUTO: 0.07 K/UL
EOSINOPHIL NFR BLD AUTO: 1.1 %
GLUCOSE 1H P 100 G GLC PO SERPL-MCNC: 125 MG/DL
GLUCOSE 2H P CHAL SERPL-MCNC: 105 MG/DL
GLUCOSE 3H P CHAL SERPL-MCNC: 60 MG/DL
GLUCOSE BS SERPL-MCNC: 84 MG/DL
HCT VFR BLD CALC: 34 %
HGB BLD-MCNC: 10.7 G/DL
IMM GRANULOCYTES NFR BLD AUTO: 0.6 %
LYMPHOCYTES # BLD AUTO: 1.32 K/UL
LYMPHOCYTES NFR BLD AUTO: 20.8 %
MAN DIFF?: NORMAL
MCHC RBC-ENTMCNC: 29.5 PG
MCHC RBC-ENTMCNC: 31.5 GM/DL
MCV RBC AUTO: 93.7 FL
MONOCYTES # BLD AUTO: 0.54 K/UL
MONOCYTES NFR BLD AUTO: 8.5 %
NEUTROPHILS # BLD AUTO: 4.34 K/UL
NEUTROPHILS NFR BLD AUTO: 68.5 %
PLATELET # BLD AUTO: 232 K/UL
RBC # BLD: 3.63 M/UL
RBC # FLD: 15.8 %
WBC # FLD AUTO: 6.34 K/UL

## 2019-05-03 DIAGNOSIS — Z3A.28 28 WEEKS GESTATION OF PREGNANCY: ICD-10-CM

## 2019-05-08 ENCOUNTER — NON-APPOINTMENT (OUTPATIENT)
Age: 34
End: 2019-05-08

## 2019-05-08 ENCOUNTER — APPOINTMENT (OUTPATIENT)
Dept: OBGYN | Facility: CLINIC | Age: 34
End: 2019-05-08
Payer: MEDICAID

## 2019-05-08 VITALS
BODY MASS INDEX: 44.9 KG/M2 | HEIGHT: 64 IN | DIASTOLIC BLOOD PRESSURE: 70 MMHG | HEART RATE: 79 BPM | SYSTOLIC BLOOD PRESSURE: 119 MMHG | WEIGHT: 263 LBS

## 2019-05-08 PROCEDURE — 0502F SUBSEQUENT PRENATAL CARE: CPT

## 2019-05-17 ENCOUNTER — OTHER (OUTPATIENT)
Age: 34
End: 2019-05-17

## 2019-05-21 LAB
CREAT 24H UR-MCNC: 1.6 G/24 H
CREAT 24H UR-MCNC: 1.6 G/24 H
CREAT ?TM UR-MCNC: 180 MG/DL
CREAT ?TM UR-MCNC: 180 MG/DL
PROT 24H UR-MRATE: 24 MG/DL
PROT ?TM UR-MCNC: 24 HR
PROT ?TM UR-MCNC: 24 HR
PROT UR-MCNC: 210 MG/24 H
SPECIMEN VOL 24H UR: 875 ML
SPECIMEN VOL 24H UR: 875 ML

## 2019-05-22 ENCOUNTER — NON-APPOINTMENT (OUTPATIENT)
Age: 34
End: 2019-05-22

## 2019-05-22 ENCOUNTER — APPOINTMENT (OUTPATIENT)
Dept: OBGYN | Facility: CLINIC | Age: 34
End: 2019-05-22
Payer: MEDICAID

## 2019-05-22 VITALS
HEIGHT: 64 IN | SYSTOLIC BLOOD PRESSURE: 107 MMHG | BODY MASS INDEX: 45.48 KG/M2 | HEART RATE: 82 BPM | WEIGHT: 266.38 LBS | DIASTOLIC BLOOD PRESSURE: 66 MMHG

## 2019-05-22 DIAGNOSIS — E66.01 MORBID (SEVERE) OBESITY DUE TO EXCESS CALORIES: ICD-10-CM

## 2019-05-22 PROCEDURE — 0502F SUBSEQUENT PRENATAL CARE: CPT

## 2019-05-23 ENCOUNTER — APPOINTMENT (OUTPATIENT)
Dept: ANTEPARTUM | Facility: CLINIC | Age: 34
End: 2019-05-23
Payer: MEDICAID

## 2019-05-23 ENCOUNTER — ASOB RESULT (OUTPATIENT)
Age: 34
End: 2019-05-23

## 2019-05-23 PROCEDURE — 93976 VASCULAR STUDY: CPT

## 2019-05-23 PROCEDURE — 76816 OB US FOLLOW-UP PER FETUS: CPT

## 2019-05-23 PROCEDURE — 76819 FETAL BIOPHYS PROFIL W/O NST: CPT

## 2019-05-23 PROCEDURE — 76820 UMBILICAL ARTERY ECHO: CPT

## 2019-05-30 ENCOUNTER — APPOINTMENT (OUTPATIENT)
Dept: ANTEPARTUM | Facility: CLINIC | Age: 34
End: 2019-05-30
Payer: MEDICAID

## 2019-05-30 ENCOUNTER — ASOB RESULT (OUTPATIENT)
Age: 34
End: 2019-05-30

## 2019-05-30 PROCEDURE — 76818 FETAL BIOPHYS PROFILE W/NST: CPT

## 2019-06-05 PROBLEM — Z3A.31 31 WEEKS GESTATION OF PREGNANCY: Status: RESOLVED | Noted: 2019-05-08 | Resolved: 2019-06-05

## 2019-06-05 PROBLEM — Z3A.32 32 WEEKS GESTATION OF PREGNANCY: Status: RESOLVED | Noted: 2019-05-22 | Resolved: 2019-06-05

## 2019-06-06 ENCOUNTER — ASOB RESULT (OUTPATIENT)
Age: 34
End: 2019-06-06

## 2019-06-06 ENCOUNTER — LABORATORY RESULT (OUTPATIENT)
Age: 34
End: 2019-06-06

## 2019-06-06 ENCOUNTER — APPOINTMENT (OUTPATIENT)
Dept: ANTEPARTUM | Facility: CLINIC | Age: 34
End: 2019-06-06
Payer: MEDICAID

## 2019-06-06 ENCOUNTER — APPOINTMENT (OUTPATIENT)
Dept: OBGYN | Facility: CLINIC | Age: 34
End: 2019-06-06
Payer: MEDICAID

## 2019-06-06 ENCOUNTER — NON-APPOINTMENT (OUTPATIENT)
Age: 34
End: 2019-06-06

## 2019-06-06 VITALS — BODY MASS INDEX: 45.06 KG/M2 | DIASTOLIC BLOOD PRESSURE: 78 MMHG | WEIGHT: 262.5 LBS | SYSTOLIC BLOOD PRESSURE: 111 MMHG

## 2019-06-06 DIAGNOSIS — Z3A.31 31 WEEKS GESTATION OF PREGNANCY: ICD-10-CM

## 2019-06-06 DIAGNOSIS — Z3A.32 32 WEEKS GESTATION OF PREGNANCY: ICD-10-CM

## 2019-06-06 LAB
BILIRUB UR QL STRIP: NORMAL
CLARITY UR: CLEAR
COLLECTION METHOD: NORMAL
GLUCOSE UR-MCNC: NORMAL
HCG UR QL: 0.2 EU/DL
HGB UR QL STRIP.AUTO: NORMAL
KETONES UR-MCNC: NORMAL
LEUKOCYTE ESTERASE UR QL STRIP: NORMAL
NITRITE UR QL STRIP: NORMAL
PH UR STRIP: 7
PROT UR STRIP-MCNC: NORMAL
SP GR UR STRIP: 1.01

## 2019-06-06 PROCEDURE — 93976 VASCULAR STUDY: CPT

## 2019-06-06 PROCEDURE — 76816 OB US FOLLOW-UP PER FETUS: CPT

## 2019-06-06 PROCEDURE — 90471 IMMUNIZATION ADMIN: CPT

## 2019-06-06 PROCEDURE — 76820 UMBILICAL ARTERY ECHO: CPT

## 2019-06-06 PROCEDURE — 76818 FETAL BIOPHYS PROFILE W/NST: CPT

## 2019-06-06 PROCEDURE — 90715 TDAP VACCINE 7 YRS/> IM: CPT

## 2019-06-06 PROCEDURE — 0502F SUBSEQUENT PRENATAL CARE: CPT

## 2019-06-06 PROCEDURE — 36415 COLL VENOUS BLD VENIPUNCTURE: CPT

## 2019-06-07 LAB — HIV1+2 AB SPEC QL IA.RAPID: NONREACTIVE

## 2019-06-10 LAB
GP B STREP DNA SPEC QL NAA+PROBE: DETECTED
GP B STREP DNA SPEC QL NAA+PROBE: NORMAL
SOURCE GBS: NORMAL

## 2019-06-13 ENCOUNTER — ASOB RESULT (OUTPATIENT)
Age: 34
End: 2019-06-13

## 2019-06-13 ENCOUNTER — APPOINTMENT (OUTPATIENT)
Dept: ANTEPARTUM | Facility: CLINIC | Age: 34
End: 2019-06-13
Payer: MEDICAID

## 2019-06-13 PROCEDURE — 76818 FETAL BIOPHYS PROFILE W/NST: CPT

## 2019-06-18 ENCOUNTER — APPOINTMENT (OUTPATIENT)
Dept: OBGYN | Facility: CLINIC | Age: 34
End: 2019-06-18
Payer: MEDICAID

## 2019-06-18 ENCOUNTER — NON-APPOINTMENT (OUTPATIENT)
Age: 34
End: 2019-06-18

## 2019-06-18 VITALS
HEART RATE: 85 BPM | HEIGHT: 64 IN | WEIGHT: 262 LBS | SYSTOLIC BLOOD PRESSURE: 125 MMHG | DIASTOLIC BLOOD PRESSURE: 74 MMHG | BODY MASS INDEX: 44.73 KG/M2

## 2019-06-18 PROCEDURE — 0502F SUBSEQUENT PRENATAL CARE: CPT

## 2019-06-18 RX ORDER — CLOTRIMAZOLE AND BETAMETHASONE DIPROPIONATE 10; .5 MG/ML; MG/ML
1-0.05 LOTION TOPICAL TWICE DAILY
Qty: 1 | Refills: 2 | Status: ACTIVE | COMMUNITY
Start: 2019-06-18 | End: 1900-01-01

## 2019-06-20 ENCOUNTER — ASOB RESULT (OUTPATIENT)
Age: 34
End: 2019-06-20

## 2019-06-20 ENCOUNTER — APPOINTMENT (OUTPATIENT)
Dept: ANTEPARTUM | Facility: CLINIC | Age: 34
End: 2019-06-20
Payer: MEDICAID

## 2019-06-20 PROCEDURE — 76816 OB US FOLLOW-UP PER FETUS: CPT

## 2019-06-20 PROCEDURE — 93976 VASCULAR STUDY: CPT

## 2019-06-20 PROCEDURE — 76818 FETAL BIOPHYS PROFILE W/NST: CPT

## 2019-06-20 PROCEDURE — 76820 UMBILICAL ARTERY ECHO: CPT

## 2019-06-24 ENCOUNTER — OUTPATIENT (OUTPATIENT)
Dept: OUTPATIENT SERVICES | Facility: HOSPITAL | Age: 34
LOS: 1 days | End: 2019-06-24
Payer: MEDICAID

## 2019-06-24 VITALS
HEIGHT: 64 IN | RESPIRATION RATE: 20 BRPM | HEART RATE: 78 BPM | SYSTOLIC BLOOD PRESSURE: 90 MMHG | DIASTOLIC BLOOD PRESSURE: 58 MMHG | WEIGHT: 249.12 LBS | TEMPERATURE: 98 F

## 2019-06-24 DIAGNOSIS — Z98.891 HISTORY OF UTERINE SCAR FROM PREVIOUS SURGERY: Chronic | ICD-10-CM

## 2019-06-24 DIAGNOSIS — Z87.39 PERSONAL HISTORY OF OTHER DISEASES OF THE MUSCULOSKELETAL SYSTEM AND CONNECTIVE TISSUE: Chronic | ICD-10-CM

## 2019-06-24 DIAGNOSIS — Z98.890 OTHER SPECIFIED POSTPROCEDURAL STATES: Chronic | ICD-10-CM

## 2019-06-24 DIAGNOSIS — Z90.49 ACQUIRED ABSENCE OF OTHER SPECIFIED PARTS OF DIGESTIVE TRACT: Chronic | ICD-10-CM

## 2019-06-24 DIAGNOSIS — Z01.818 ENCOUNTER FOR OTHER PREPROCEDURAL EXAMINATION: ICD-10-CM

## 2019-06-24 LAB
ANION GAP SERPL CALC-SCNC: 12 MMOL/L — SIGNIFICANT CHANGE UP (ref 5–17)
APPEARANCE UR: CLEAR — SIGNIFICANT CHANGE UP
BACTERIA # UR AUTO: NEGATIVE — SIGNIFICANT CHANGE UP
BASOPHILS # BLD AUTO: 0 K/UL — SIGNIFICANT CHANGE UP (ref 0–0.2)
BASOPHILS NFR BLD AUTO: 0.2 % — SIGNIFICANT CHANGE UP (ref 0–2)
BILIRUB UR-MCNC: NEGATIVE — SIGNIFICANT CHANGE UP
BLD GP AB SCN SERPL QL: SIGNIFICANT CHANGE UP
BUN SERPL-MCNC: 9 MG/DL — SIGNIFICANT CHANGE UP (ref 8–20)
CALCIUM SERPL-MCNC: 9.2 MG/DL — SIGNIFICANT CHANGE UP (ref 8.6–10.2)
CHLORIDE SERPL-SCNC: 100 MMOL/L — SIGNIFICANT CHANGE UP (ref 98–107)
CO2 SERPL-SCNC: 24 MMOL/L — SIGNIFICANT CHANGE UP (ref 22–29)
COLOR SPEC: YELLOW — SIGNIFICANT CHANGE UP
CREAT SERPL-MCNC: 0.59 MG/DL — SIGNIFICANT CHANGE UP (ref 0.5–1.3)
DIFF PNL FLD: NEGATIVE — SIGNIFICANT CHANGE UP
EOSINOPHIL # BLD AUTO: 0 K/UL — SIGNIFICANT CHANGE UP (ref 0–0.5)
EOSINOPHIL NFR BLD AUTO: 0.8 % — SIGNIFICANT CHANGE UP (ref 0–6)
EPI CELLS # UR: SIGNIFICANT CHANGE UP
GLUCOSE SERPL-MCNC: 81 MG/DL — SIGNIFICANT CHANGE UP (ref 70–115)
GLUCOSE UR QL: NEGATIVE MG/DL — SIGNIFICANT CHANGE UP
HCT VFR BLD CALC: 35.1 % — LOW (ref 37–47)
HGB BLD-MCNC: 11.8 G/DL — LOW (ref 12–16)
KETONES UR-MCNC: NEGATIVE — SIGNIFICANT CHANGE UP
LEUKOCYTE ESTERASE UR-ACNC: NEGATIVE — SIGNIFICANT CHANGE UP
LYMPHOCYTES # BLD AUTO: 1 K/UL — SIGNIFICANT CHANGE UP (ref 1–4.8)
LYMPHOCYTES # BLD AUTO: 19.5 % — LOW (ref 20–55)
MCHC RBC-ENTMCNC: 29.8 PG — SIGNIFICANT CHANGE UP (ref 27–31)
MCHC RBC-ENTMCNC: 33.6 G/DL — SIGNIFICANT CHANGE UP (ref 32–36)
MCV RBC AUTO: 88.6 FL — SIGNIFICANT CHANGE UP (ref 81–99)
MONOCYTES # BLD AUTO: 0.4 K/UL — SIGNIFICANT CHANGE UP (ref 0–0.8)
MONOCYTES NFR BLD AUTO: 7.5 % — SIGNIFICANT CHANGE UP (ref 3–10)
NEUTROPHILS # BLD AUTO: 3.7 K/UL — SIGNIFICANT CHANGE UP (ref 1.8–8)
NEUTROPHILS NFR BLD AUTO: 71.2 % — SIGNIFICANT CHANGE UP (ref 37–73)
NITRITE UR-MCNC: NEGATIVE — SIGNIFICANT CHANGE UP
PH UR: 6.5 — SIGNIFICANT CHANGE UP (ref 5–8)
PLATELET # BLD AUTO: 215 K/UL — SIGNIFICANT CHANGE UP (ref 150–400)
POTASSIUM SERPL-MCNC: 4.1 MMOL/L — SIGNIFICANT CHANGE UP (ref 3.5–5.3)
POTASSIUM SERPL-SCNC: 4.1 MMOL/L — SIGNIFICANT CHANGE UP (ref 3.5–5.3)
PROT UR-MCNC: 15 MG/DL
RBC # BLD: 3.96 M/UL — LOW (ref 4.4–5.2)
RBC # FLD: 14.9 % — SIGNIFICANT CHANGE UP (ref 11–15.6)
RBC CASTS # UR COMP ASSIST: NEGATIVE /HPF — SIGNIFICANT CHANGE UP (ref 0–4)
SODIUM SERPL-SCNC: 136 MMOL/L — SIGNIFICANT CHANGE UP (ref 135–145)
SP GR SPEC: 1.01 — SIGNIFICANT CHANGE UP (ref 1.01–1.02)
TYPE + AB SCN PNL BLD: SIGNIFICANT CHANGE UP
UROBILINOGEN FLD QL: 1 MG/DL
WBC # BLD: 5.2 K/UL — SIGNIFICANT CHANGE UP (ref 4.8–10.8)
WBC # FLD AUTO: 5.2 K/UL — SIGNIFICANT CHANGE UP (ref 4.8–10.8)
WBC UR QL: SIGNIFICANT CHANGE UP

## 2019-06-24 PROCEDURE — 85027 COMPLETE CBC AUTOMATED: CPT

## 2019-06-24 PROCEDURE — 86901 BLOOD TYPING SEROLOGIC RH(D): CPT

## 2019-06-24 PROCEDURE — 36415 COLL VENOUS BLD VENIPUNCTURE: CPT

## 2019-06-24 PROCEDURE — 81001 URINALYSIS AUTO W/SCOPE: CPT

## 2019-06-24 PROCEDURE — 80048 BASIC METABOLIC PNL TOTAL CA: CPT

## 2019-06-24 PROCEDURE — 86850 RBC ANTIBODY SCREEN: CPT

## 2019-06-24 PROCEDURE — 86900 BLOOD TYPING SEROLOGIC ABO: CPT

## 2019-06-25 DIAGNOSIS — Z3A.35 35 WEEKS GESTATION OF PREGNANCY: ICD-10-CM

## 2019-06-25 DIAGNOSIS — Z3A.36 36 WEEKS GESTATION OF PREGNANCY: ICD-10-CM

## 2019-06-26 ENCOUNTER — NON-APPOINTMENT (OUTPATIENT)
Age: 34
End: 2019-06-26

## 2019-06-26 ENCOUNTER — APPOINTMENT (OUTPATIENT)
Dept: OBGYN | Facility: CLINIC | Age: 34
End: 2019-06-26
Payer: MEDICAID

## 2019-06-26 VITALS
BODY MASS INDEX: 44.39 KG/M2 | DIASTOLIC BLOOD PRESSURE: 65 MMHG | WEIGHT: 260 LBS | HEART RATE: 84 BPM | SYSTOLIC BLOOD PRESSURE: 102 MMHG | HEIGHT: 64 IN

## 2019-06-26 PROBLEM — I05.9 RHEUMATIC MITRAL VALVE DISEASE, UNSPECIFIED: Chronic | Status: ACTIVE | Noted: 2019-06-24

## 2019-06-26 PROCEDURE — 0502F SUBSEQUENT PRENATAL CARE: CPT

## 2019-06-27 ENCOUNTER — ASOB RESULT (OUTPATIENT)
Age: 34
End: 2019-06-27

## 2019-06-27 ENCOUNTER — APPOINTMENT (OUTPATIENT)
Dept: ANTEPARTUM | Facility: CLINIC | Age: 34
End: 2019-06-27
Payer: MEDICAID

## 2019-06-27 PROCEDURE — 76818 FETAL BIOPHYS PROFILE W/NST: CPT

## 2019-06-28 ENCOUNTER — TRANSCRIPTION ENCOUNTER (OUTPATIENT)
Age: 34
End: 2019-06-28

## 2019-06-29 ENCOUNTER — TRANSCRIPTION ENCOUNTER (OUTPATIENT)
Age: 34
End: 2019-06-29

## 2019-06-29 ENCOUNTER — INPATIENT (INPATIENT)
Facility: HOSPITAL | Age: 34
LOS: 3 days | Discharge: HOME | End: 2019-07-03
Attending: OBSTETRICS & GYNECOLOGY | Admitting: OBSTETRICS & GYNECOLOGY
Payer: MEDICAID

## 2019-06-29 ENCOUNTER — APPOINTMENT (OUTPATIENT)
Dept: OBGYN | Facility: HOSPITAL | Age: 34
End: 2019-06-29

## 2019-06-29 ENCOUNTER — RESULT REVIEW (OUTPATIENT)
Age: 34
End: 2019-06-29

## 2019-06-29 VITALS
RESPIRATION RATE: 16 BRPM | TEMPERATURE: 97 F | HEART RATE: 84 BPM | WEIGHT: 259.93 LBS | HEIGHT: 64 IN | DIASTOLIC BLOOD PRESSURE: 72 MMHG | SYSTOLIC BLOOD PRESSURE: 111 MMHG

## 2019-06-29 DIAGNOSIS — Z98.891 HISTORY OF UTERINE SCAR FROM PREVIOUS SURGERY: Chronic | ICD-10-CM

## 2019-06-29 DIAGNOSIS — Z98.890 OTHER SPECIFIED POSTPROCEDURAL STATES: Chronic | ICD-10-CM

## 2019-06-29 DIAGNOSIS — Z87.39 PERSONAL HISTORY OF OTHER DISEASES OF THE MUSCULOSKELETAL SYSTEM AND CONNECTIVE TISSUE: Chronic | ICD-10-CM

## 2019-06-29 DIAGNOSIS — Z90.49 ACQUIRED ABSENCE OF OTHER SPECIFIED PARTS OF DIGESTIVE TRACT: Chronic | ICD-10-CM

## 2019-06-29 DIAGNOSIS — Z98.891 HISTORY OF UTERINE SCAR FROM PREVIOUS SURGERY: ICD-10-CM

## 2019-06-29 DIAGNOSIS — O10.919 UNSPECIFIED PRE-EXISTING HYPERTENSION COMPLICATING PREGNANCY, UNSPECIFIED TRIMESTER: ICD-10-CM

## 2019-06-29 DIAGNOSIS — O34.219 MATERNAL CARE FOR UNSPECIFIED TYPE SCAR FROM PREVIOUS CESAREAN DELIVERY: ICD-10-CM

## 2019-06-29 DIAGNOSIS — Z3A.38 38 WEEKS GESTATION OF PREGNANCY: ICD-10-CM

## 2019-06-29 LAB
ALBUMIN SERPL ELPH-MCNC: 3.2 G/DL — LOW (ref 3.3–5.2)
ALP SERPL-CCNC: 137 U/L — HIGH (ref 40–120)
ALT FLD-CCNC: 128 U/L — HIGH
ANION GAP SERPL CALC-SCNC: 10 MMOL/L — SIGNIFICANT CHANGE UP (ref 5–17)
AST SERPL-CCNC: 94 U/L — HIGH
BASOPHILS # BLD AUTO: 0.01 K/UL — SIGNIFICANT CHANGE UP (ref 0–0.2)
BASOPHILS NFR BLD AUTO: 0.1 % — SIGNIFICANT CHANGE UP (ref 0–2)
BILIRUB SERPL-MCNC: 0.3 MG/DL — LOW (ref 0.4–2)
BLD GP AB SCN SERPL QL: SIGNIFICANT CHANGE UP
BUN SERPL-MCNC: 6 MG/DL — LOW (ref 8–20)
CALCIUM SERPL-MCNC: 9.1 MG/DL — SIGNIFICANT CHANGE UP (ref 8.6–10.2)
CHLORIDE SERPL-SCNC: 104 MMOL/L — SIGNIFICANT CHANGE UP (ref 98–107)
CO2 SERPL-SCNC: 21 MMOL/L — LOW (ref 22–29)
CREAT ?TM UR-MCNC: 98 MG/DL — SIGNIFICANT CHANGE UP
CREAT SERPL-MCNC: 0.46 MG/DL — LOW (ref 0.5–1.3)
EOSINOPHIL # BLD AUTO: 0 K/UL — SIGNIFICANT CHANGE UP (ref 0–0.5)
EOSINOPHIL NFR BLD AUTO: 0 % — SIGNIFICANT CHANGE UP (ref 0–6)
GLUCOSE SERPL-MCNC: 96 MG/DL — SIGNIFICANT CHANGE UP (ref 70–115)
HCT VFR BLD CALC: 35.5 % — SIGNIFICANT CHANGE UP (ref 34.5–45)
HCT VFR BLD CALC: 36.8 % — SIGNIFICANT CHANGE UP (ref 34.5–45)
HGB BLD-MCNC: 11.7 G/DL — SIGNIFICANT CHANGE UP (ref 11.5–15.5)
HGB BLD-MCNC: 12.5 G/DL — SIGNIFICANT CHANGE UP (ref 11.5–15.5)
IMM GRANULOCYTES NFR BLD AUTO: 0.8 % — SIGNIFICANT CHANGE UP (ref 0–1.5)
LYMPHOCYTES # BLD AUTO: 0.85 K/UL — LOW (ref 1–3.3)
LYMPHOCYTES # BLD AUTO: 11.4 % — LOW (ref 13–44)
MCHC RBC-ENTMCNC: 29.5 PG — SIGNIFICANT CHANGE UP (ref 27–34)
MCHC RBC-ENTMCNC: 30.1 PG — SIGNIFICANT CHANGE UP (ref 27–34)
MCHC RBC-ENTMCNC: 33 GM/DL — SIGNIFICANT CHANGE UP (ref 32–36)
MCHC RBC-ENTMCNC: 34 GM/DL — SIGNIFICANT CHANGE UP (ref 32–36)
MCV RBC AUTO: 88.7 FL — SIGNIFICANT CHANGE UP (ref 80–100)
MCV RBC AUTO: 89.4 FL — SIGNIFICANT CHANGE UP (ref 80–100)
MONOCYTES # BLD AUTO: 0.35 K/UL — SIGNIFICANT CHANGE UP (ref 0–0.9)
MONOCYTES NFR BLD AUTO: 4.7 % — SIGNIFICANT CHANGE UP (ref 2–14)
NEUTROPHILS # BLD AUTO: 6.21 K/UL — SIGNIFICANT CHANGE UP (ref 1.8–7.4)
NEUTROPHILS NFR BLD AUTO: 83 % — HIGH (ref 43–77)
PLATELET # BLD AUTO: 218 K/UL — SIGNIFICANT CHANGE UP (ref 150–400)
PLATELET # BLD AUTO: 224 K/UL — SIGNIFICANT CHANGE UP (ref 150–400)
POTASSIUM SERPL-MCNC: 4.3 MMOL/L — SIGNIFICANT CHANGE UP (ref 3.5–5.3)
POTASSIUM SERPL-SCNC: 4.3 MMOL/L — SIGNIFICANT CHANGE UP (ref 3.5–5.3)
PROT ?TM UR-MCNC: 33 MG/DL — HIGH (ref 0–12)
PROT SERPL-MCNC: 6.5 G/DL — LOW (ref 6.6–8.7)
PROT/CREAT UR-RTO: 0.3 RATIO — HIGH
RBC # BLD: 3.97 M/UL — SIGNIFICANT CHANGE UP (ref 3.8–5.2)
RBC # BLD: 4.15 M/UL — SIGNIFICANT CHANGE UP (ref 3.8–5.2)
RBC # FLD: 13.6 % — SIGNIFICANT CHANGE UP (ref 10.3–14.5)
RBC # FLD: 14 % — SIGNIFICANT CHANGE UP (ref 10.3–14.5)
SODIUM SERPL-SCNC: 135 MMOL/L — SIGNIFICANT CHANGE UP (ref 135–145)
TYPE + AB SCN PNL BLD: SIGNIFICANT CHANGE UP
WBC # BLD: 7.48 K/UL — SIGNIFICANT CHANGE UP (ref 3.8–10.5)
WBC # BLD: 7.69 K/UL — SIGNIFICANT CHANGE UP (ref 3.8–10.5)
WBC # FLD AUTO: 7.48 K/UL — SIGNIFICANT CHANGE UP (ref 3.8–10.5)
WBC # FLD AUTO: 7.69 K/UL — SIGNIFICANT CHANGE UP (ref 3.8–10.5)

## 2019-06-29 PROCEDURE — 88302 TISSUE EXAM BY PATHOLOGIST: CPT | Mod: 26

## 2019-06-29 PROCEDURE — 58740 ADHESIOLYSIS TUBE OVARY: CPT

## 2019-06-29 PROCEDURE — 88304 TISSUE EXAM BY PATHOLOGIST: CPT | Mod: 26

## 2019-06-29 PROCEDURE — 58611 LIGATE OVIDUCT(S) ADD-ON: CPT

## 2019-06-29 RX ORDER — OXYCODONE HYDROCHLORIDE 5 MG/1
5 TABLET ORAL ONCE
Refills: 0 | Status: DISCONTINUED | OUTPATIENT
Start: 2019-06-29 | End: 2019-07-03

## 2019-06-29 RX ORDER — SODIUM CHLORIDE 9 MG/ML
1000 INJECTION, SOLUTION INTRAVENOUS
Refills: 0 | Status: DISCONTINUED | OUTPATIENT
Start: 2019-06-29 | End: 2019-06-29

## 2019-06-29 RX ORDER — DEXAMETHASONE 0.5 MG/5ML
4 ELIXIR ORAL EVERY 6 HOURS
Refills: 0 | Status: DISCONTINUED | OUTPATIENT
Start: 2019-06-29 | End: 2019-07-03

## 2019-06-29 RX ORDER — DIPHENHYDRAMINE HCL 50 MG
25 CAPSULE ORAL EVERY 4 HOURS
Refills: 0 | Status: DISCONTINUED | OUTPATIENT
Start: 2019-06-29 | End: 2019-07-03

## 2019-06-29 RX ORDER — ONDANSETRON 8 MG/1
4 TABLET, FILM COATED ORAL EVERY 6 HOURS
Refills: 0 | Status: DISCONTINUED | OUTPATIENT
Start: 2019-06-29 | End: 2019-07-03

## 2019-06-29 RX ORDER — LABETALOL HCL 100 MG
300 TABLET ORAL THREE TIMES A DAY
Refills: 0 | Status: DISCONTINUED | OUTPATIENT
Start: 2019-06-29 | End: 2019-06-30

## 2019-06-29 RX ORDER — SODIUM CHLORIDE 9 MG/ML
1000 INJECTION, SOLUTION INTRAVENOUS
Refills: 0 | Status: DISCONTINUED | OUTPATIENT
Start: 2019-06-29 | End: 2019-07-03

## 2019-06-29 RX ORDER — LEVETIRACETAM 250 MG/1
500 TABLET, FILM COATED ORAL
Refills: 0 | Status: DISCONTINUED | OUTPATIENT
Start: 2019-06-29 | End: 2019-07-03

## 2019-06-29 RX ORDER — KETOROLAC TROMETHAMINE 30 MG/ML
30 SYRINGE (ML) INJECTION ONCE
Refills: 0 | Status: DISCONTINUED | OUTPATIENT
Start: 2019-06-29 | End: 2019-06-29

## 2019-06-29 RX ORDER — CEFAZOLIN SODIUM 1 G
2000 VIAL (EA) INJECTION ONCE
Refills: 0 | Status: DISCONTINUED | OUTPATIENT
Start: 2019-06-29 | End: 2019-06-29

## 2019-06-29 RX ORDER — MAGNESIUM HYDROXIDE 400 MG/1
30 TABLET, CHEWABLE ORAL
Refills: 0 | Status: DISCONTINUED | OUTPATIENT
Start: 2019-06-29 | End: 2019-07-03

## 2019-06-29 RX ORDER — LANOLIN
1 OINTMENT (GRAM) TOPICAL EVERY 6 HOURS
Refills: 0 | Status: DISCONTINUED | OUTPATIENT
Start: 2019-06-29 | End: 2019-07-03

## 2019-06-29 RX ORDER — OXYTOCIN 10 UNIT/ML
333.33 VIAL (ML) INJECTION
Qty: 20 | Refills: 0 | Status: DISCONTINUED | OUTPATIENT
Start: 2019-06-29 | End: 2019-07-03

## 2019-06-29 RX ORDER — LABETALOL HCL 100 MG
200 TABLET ORAL
Refills: 0 | Status: DISCONTINUED | OUTPATIENT
Start: 2019-06-29 | End: 2019-06-29

## 2019-06-29 RX ORDER — SODIUM CHLORIDE 9 MG/ML
1000 INJECTION, SOLUTION INTRAVENOUS ONCE
Refills: 0 | Status: COMPLETED | OUTPATIENT
Start: 2019-06-29 | End: 2019-06-29

## 2019-06-29 RX ORDER — NALBUPHINE HYDROCHLORIDE 10 MG/ML
5 INJECTION, SOLUTION INTRAMUSCULAR; INTRAVENOUS; SUBCUTANEOUS ONCE
Refills: 0 | Status: DISCONTINUED | OUTPATIENT
Start: 2019-06-29 | End: 2019-07-03

## 2019-06-29 RX ORDER — TETANUS TOXOID, REDUCED DIPHTHERIA TOXOID AND ACELLULAR PERTUSSIS VACCINE, ADSORBED 5; 2.5; 8; 8; 2.5 [IU]/.5ML; [IU]/.5ML; UG/.5ML; UG/.5ML; UG/.5ML
0.5 SUSPENSION INTRAMUSCULAR ONCE
Refills: 0 | Status: DISCONTINUED | OUTPATIENT
Start: 2019-06-29 | End: 2019-07-03

## 2019-06-29 RX ORDER — OXYCODONE HYDROCHLORIDE 5 MG/1
5 TABLET ORAL
Refills: 0 | Status: DISCONTINUED | OUTPATIENT
Start: 2019-06-29 | End: 2019-07-03

## 2019-06-29 RX ORDER — GLYCERIN ADULT
1 SUPPOSITORY, RECTAL RECTAL AT BEDTIME
Refills: 0 | Status: DISCONTINUED | OUTPATIENT
Start: 2019-06-29 | End: 2019-07-03

## 2019-06-29 RX ORDER — SIMETHICONE 80 MG/1
80 TABLET, CHEWABLE ORAL EVERY 4 HOURS
Refills: 0 | Status: DISCONTINUED | OUTPATIENT
Start: 2019-06-29 | End: 2019-07-03

## 2019-06-29 RX ORDER — ACETAMINOPHEN 500 MG
975 TABLET ORAL
Refills: 0 | Status: DISCONTINUED | OUTPATIENT
Start: 2019-06-30 | End: 2019-07-03

## 2019-06-29 RX ORDER — IBUPROFEN 200 MG
600 TABLET ORAL EVERY 6 HOURS
Refills: 0 | Status: COMPLETED | OUTPATIENT
Start: 2019-06-29 | End: 2020-05-27

## 2019-06-29 RX ORDER — FERROUS SULFATE 325(65) MG
325 TABLET ORAL THREE TIMES A DAY
Refills: 0 | Status: DISCONTINUED | OUTPATIENT
Start: 2019-06-29 | End: 2019-07-03

## 2019-06-29 RX ORDER — ENOXAPARIN SODIUM 100 MG/ML
40 INJECTION SUBCUTANEOUS EVERY 24 HOURS
Refills: 0 | Status: DISCONTINUED | OUTPATIENT
Start: 2019-06-29 | End: 2019-07-03

## 2019-06-29 RX ORDER — FAMOTIDINE 10 MG/ML
20 INJECTION INTRAVENOUS ONCE
Refills: 0 | Status: COMPLETED | OUTPATIENT
Start: 2019-06-29 | End: 2019-06-29

## 2019-06-29 RX ORDER — CEFAZOLIN SODIUM 1 G
3000 VIAL (EA) INJECTION ONCE
Refills: 0 | Status: COMPLETED | OUTPATIENT
Start: 2019-06-29 | End: 2019-06-29

## 2019-06-29 RX ORDER — KETOROLAC TROMETHAMINE 30 MG/ML
30 SYRINGE (ML) INJECTION EVERY 6 HOURS
Refills: 0 | Status: DISCONTINUED | OUTPATIENT
Start: 2019-06-29 | End: 2019-06-30

## 2019-06-29 RX ORDER — ASCORBIC ACID 60 MG
250 TABLET,CHEWABLE ORAL THREE TIMES A DAY
Refills: 0 | Status: DISCONTINUED | OUTPATIENT
Start: 2019-06-29 | End: 2019-07-03

## 2019-06-29 RX ORDER — DOCUSATE SODIUM 100 MG
100 CAPSULE ORAL THREE TIMES A DAY
Refills: 0 | Status: DISCONTINUED | OUTPATIENT
Start: 2019-06-29 | End: 2019-07-03

## 2019-06-29 RX ORDER — CITRIC ACID/SODIUM CITRATE 300-500 MG
30 SOLUTION, ORAL ORAL ONCE
Refills: 0 | Status: COMPLETED | OUTPATIENT
Start: 2019-06-29 | End: 2019-06-29

## 2019-06-29 RX ORDER — DIPHENHYDRAMINE HCL 50 MG
25 CAPSULE ORAL EVERY 6 HOURS
Refills: 0 | Status: DISCONTINUED | OUTPATIENT
Start: 2019-06-29 | End: 2019-07-03

## 2019-06-29 RX ORDER — TRANEXAMIC ACID 100 MG/ML
1000 INJECTION, SOLUTION INTRAVENOUS ONCE
Refills: 0 | Status: COMPLETED | OUTPATIENT
Start: 2019-06-29 | End: 2019-06-29

## 2019-06-29 RX ORDER — NALOXONE HYDROCHLORIDE 4 MG/.1ML
0.1 SPRAY NASAL
Refills: 0 | Status: DISCONTINUED | OUTPATIENT
Start: 2019-06-29 | End: 2019-07-03

## 2019-06-29 RX ORDER — METOCLOPRAMIDE HCL 10 MG
10 TABLET ORAL ONCE
Refills: 0 | Status: DISCONTINUED | OUTPATIENT
Start: 2019-06-29 | End: 2019-06-29

## 2019-06-29 RX ORDER — ONDANSETRON 8 MG/1
4 TABLET, FILM COATED ORAL ONCE
Refills: 0 | Status: DISCONTINUED | OUTPATIENT
Start: 2019-06-29 | End: 2019-07-03

## 2019-06-29 RX ADMIN — Medication 30 MILLIGRAM(S): at 12:33

## 2019-06-29 RX ADMIN — TRANEXAMIC ACID 220 MILLIGRAM(S): 100 INJECTION, SOLUTION INTRAVENOUS at 09:25

## 2019-06-29 RX ADMIN — ENOXAPARIN SODIUM 40 MILLIGRAM(S): 100 INJECTION SUBCUTANEOUS at 22:09

## 2019-06-29 RX ADMIN — Medication 1000 MILLIUNIT(S)/MIN: at 09:45

## 2019-06-29 RX ADMIN — Medication 30 MILLIGRAM(S): at 18:36

## 2019-06-29 RX ADMIN — Medication 100 MILLIGRAM(S): at 09:10

## 2019-06-29 RX ADMIN — Medication 250 MILLIGRAM(S): at 22:09

## 2019-06-29 RX ADMIN — Medication 30 MILLILITER(S): at 07:18

## 2019-06-29 RX ADMIN — SODIUM CHLORIDE 2000 MILLILITER(S): 9 INJECTION, SOLUTION INTRAVENOUS at 07:03

## 2019-06-29 RX ADMIN — Medication 300 MILLIGRAM(S): at 15:21

## 2019-06-29 RX ADMIN — FAMOTIDINE 20 MILLIGRAM(S): 10 INJECTION INTRAVENOUS at 07:36

## 2019-06-29 RX ADMIN — Medication 325 MILLIGRAM(S): at 22:10

## 2019-06-29 RX ADMIN — Medication 100 MILLIGRAM(S): at 22:09

## 2019-06-29 RX ADMIN — Medication 30 MILLIGRAM(S): at 12:18

## 2019-06-29 RX ADMIN — SODIUM CHLORIDE 125 MILLILITER(S): 9 INJECTION, SOLUTION INTRAVENOUS at 07:40

## 2019-06-29 RX ADMIN — SODIUM CHLORIDE 125 MILLILITER(S): 9 INJECTION, SOLUTION INTRAVENOUS at 14:20

## 2019-06-29 RX ADMIN — ONDANSETRON 4 MILLIGRAM(S): 8 TABLET, FILM COATED ORAL at 18:36

## 2019-06-29 RX ADMIN — Medication 30 MILLIGRAM(S): at 18:50

## 2019-06-29 NOTE — DISCHARGE NOTE OB - PATIENT PORTAL LINK FT
You can access the [facility], offered by [facility], by registering with the following website: [email or web address]

## 2019-06-29 NOTE — OB NEONATOLOGY/PEDIATRICIAN DELIVERY SUMMARY - NSPEDSNEONOTESA_OBGYN_ALL_OB_FT
Naif requested to attend  Repeat   with BTL  by Dr. Bell . Infant is a 38 week M born to a 34 y/o    Blood type   O+, PNL negative and immune, GBS positive  mother. Maternal history significant for mitral valve prolapse, HTN, Tatiana 2000, Knee surgery and Anemia. Family history noncontributory. Social history denies illicit drug use. Infant born vigorous with two cords around the neck tight , spontaneous cry. Routine resuscitation. Apgars 9/9. 3 vessel cord. PE wnl showed to FOB. BW 2990g (AGA). Transfer to [facility] for routine care under management of PMD.

## 2019-06-29 NOTE — OB PROVIDER H&P - HISTORY OF PRESENT ILLNESS
Patient is a 32yo  at 38 0/7 weeks consistent with ROXY 2019 who presents to L&D for scheduled repeat c/s and bilateral salpingectomy   Prenatal course complicated by   1. Hx of chronic hypertension on labetalol 200mg BID   2. Hx of three prior  sections   3. AMA     OBHx:    pCS NRFHT 7-8lb    rCS 7-6 lb    rCS 8-0 lb     PMH: chronic HTN   PSH: cholecystectomy   ALL: NKDA

## 2019-06-29 NOTE — CHART NOTE - NSCHARTNOTEFT_GEN_A_CORE
S: Patient doing well, still feels nausea. No headaches, vision changes. No RUQ pain     O:  Vital Signs Last 24 Hrs  T(C): 36.3 (29 Jun 2019 19:49), Max: 36.4 (29 Jun 2019 10:35)  T(F): 97.3 (29 Jun 2019 19:49), Max: 97.5 (29 Jun 2019 10:35)  HR: 67 (29 Jun 2019 19:49) (61 - 84)  BP: 143/92 (29 Jun 2019 19:49) (107/65 - 156/95)  RR: 18 (29 Jun 2019 19:49) (11 - 22)  SpO2: 96% (29 Jun 2019 19:49) (92% - 100%)    Lungs: CTA   Abdomen: no RUQ tenderness                             11.7   7.48  )-----------( 218      ( 29 Jun 2019 19:26 )             35.5   06-29    135  |  104  |  6.0<L>  ----------------------------<  96  4.3   |  21.0<L>  |  0.46<L>    Ca    9.1      29 Jun 2019 17:26    TPro  6.5<L>  /  Alb  3.2<L>  /  TBili  0.3<L>  /  DBili  x   /  AST  94<H>  /  ALT  128<H>  /  AlkPhos  137<H>  06-29    A/P  Patient with chronic hypertension with evidence of superimposed preeclampsia based on twice normal LFTs (normal 3/2019) and elevated BPs in mid 140s.   Pt is asymptomatic.   Will transfer to L&D and start magnesium sulfate for neuroprotection    Dr. Armstrong aware of plan

## 2019-06-29 NOTE — BRIEF OPERATIVE NOTE - NSICDXBRIEFPOSTOP_GEN_ALL_CORE_FT
POST-OP DIAGNOSIS:  Pelvic adhesions 2019 10:30:18  Rajiv Armstrong  Multiparity 2019 10:30:03  Rajiv Armstrong  Previous  delivery, delivered 2019 10:29:52  Rajiv Armstrong

## 2019-06-29 NOTE — OB PROVIDER H&P - ASSESSMENT
Patient is a 34yo  at 38 0/7 weeks consistent with ROXY 2019 who presents to L&D for scheduled repeat c/s and bilateral salpingectomy

## 2019-06-29 NOTE — OB RN PATIENT PROFILE - PRO ANTIBODY SCREEN
negative Complex Repair And A-T Advancement Flap Text: The defect edges were debeveled with a #15 scalpel blade.  The primary defect was closed partially with a complex linear closure.  Given the location of the remaining defect, shape of the defect and the proximity to free margins an A-T advancement flap was deemed most appropriate for complete closure of the defect.  Using a sterile surgical marker, an appropriate advancement flap was drawn incorporating the defect and placing the expected incisions within the relaxed skin tension lines where possible.    The area thus outlined was incised deep to adipose tissue with a #15 scalpel blade.  The skin margins were undermined to an appropriate distance in all directions utilizing iris scissors.

## 2019-06-29 NOTE — OB PROVIDER H&P - NSHPPHYSICALEXAM_GEN_ALL_CORE
Vital Signs Last 24 Hrs  T(C): 36.3 (29 Jun 2019 06:37), Max: 36.3 (29 Jun 2019 06:37)  T(F): 97.3 (29 Jun 2019 06:37), Max: 97.3 (29 Jun 2019 06:37)  HR: 84 (29 Jun 2019 06:37) (84 - 84)  BP: 111/72 (29 Jun 2019 06:37) (111/72 - 111/72)  RR: 16 (29 Jun 2019 06:37) (16 - 16)    CV: RRR   Lungs: CTA   Abdomen: soft, nontender    FHT:
Angelika ROSADO

## 2019-06-29 NOTE — OB RN PATIENT PROFILE - PRO PRENATAL LABS ORI SOURCE ANTIBODY SCREEN
----- Message from Yumiko Parish MD sent at 2/8/2018 11:04 AM CST -----  Triage to inform patient/parent of negative flu test   hard copy, drawn during this pregnancy

## 2019-06-29 NOTE — DISCHARGE NOTE OB - ADDITIONAL INSTRUCTIONS
Please follow up with your OB-GYN in 4 days for incision check.  Please follow up with your OB-GYN in 6 weeks for routine check.  Please follow up with your Primary Care Doctor in 3-5 days for blood pressure check.

## 2019-06-29 NOTE — OB PROVIDER H&P - PROBLEM SELECTOR PLAN 2
Will continue labetalol 200mg BID and monitor closely for any signs or symptoms of superimposed preeclampsia

## 2019-06-29 NOTE — DISCHARGE NOTE OB - MEDICATION SUMMARY - MEDICATIONS TO STOP TAKING
I will STOP taking the medications listed below when I get home from the hospital:    prenatal vitamins  -- once a day

## 2019-06-29 NOTE — DISCHARGE NOTE OB - PLAN OF CARE
Healthy mother and baby - Recommended early ambulation, adequate hydration and a balanced diet. Mother-baby interaction also encouraged and breastfeeding.  -Pelvic rest x 6 weeks  -Incision check in 4 days at your OB-GYN.  -Postpartum care in 6 weeks at your OB-GYN. Less than 140/90 -Please follow up with your Primary Care Doctor in 3-5 days for blood pressure check.  -Continue taking your home medication.  -Check your blood pressure daily and keep a log for your doctor. If the top number is more than 150  the bottom number more than 100, please contact your doctor immediately.

## 2019-06-29 NOTE — BRIEF OPERATIVE NOTE - NSICDXBRIEFPREOP_GEN_ALL_CORE_FT
PRE-OP DIAGNOSIS:  Multiparity 2019 10:29:20  Rajiv Armstrong  Pelvic adhesions 2019 10:29:06  Rajiv Armstrong  Previous  delivery affecting pregnancy 2019 10:28:55  Rajiv Armstrong

## 2019-06-29 NOTE — OB RN PATIENT PROFILE - PSH
H/O dilation and curettage    History of  section  X3 , , 2013  History of cholecystectomy    History of knee problem  right arthroscopy

## 2019-06-29 NOTE — BRIEF OPERATIVE NOTE - NSICDXBRIEFPROCEDURE_GEN_ALL_CORE_FT
PROCEDURES:  Repeat  section for multiple gestation pregnancy 2019 10:28:10  Rajiv Armstrong  Delivery,  section, with bilateral salpingectomy 2019 10:27:46  Rajiv Armstrong  Lysis of pelvic adhesions 2019 10:27:18  Rajiv Armstrong  Section,  2019 10:27:08  Rajiv Armstrong

## 2019-06-29 NOTE — DISCHARGE NOTE OB - MEDICATION SUMMARY - MEDICATIONS TO TAKE
I will START or STAY ON the medications listed below when I get home from the hospital:    ibuprofen 600 mg oral tablet  -- 1 tab(s) by mouth every 6 hours  -- Indication: For Mild to moderate pain    oxyCODONE-acetaminophen 5 mg-325 mg oral tablet  -- 1 tab(s) by mouth every 6 hours, As Needed  -for moderate pain MDD:4  -- Caution federal law prohibits the transfer of this drug to any person other  than the person for whom it was prescribed.  May cause drowsiness.  Alcohol may intensify this effect.  Use care when operating dangerous machinery.  This prescription cannot be refilled.  This product contains acetaminophen.  Do not use  with any other product containing acetaminophen to prevent possible liver damage.  Using more of this medication than prescribed may cause serious breathing problems.    -- Indication: For Severe pain     labetalol 200 mg oral tablet  -- 1 tab(s) by mouth 2 times a day  -- Indication: For Hypertension    furosemide 20 mg oral tablet  -- 1 tab(s) by mouth once a day, As Needed  -- Indication: For Edema     ferrous sulfate 325 mg (65 mg elemental iron) oral tablet  -- 1 tab(s) by mouth 3 times a day  -- Indication: For anemia     docusate sodium 100 mg oral capsule  -- 1 cap(s) by mouth 3 times a day  -- Indication: For stool softener     ascorbic acid 250 mg oral tablet  -- 1 tab(s) by mouth 3 times a day  -- Indication: For supplement to be taken with iron tablets

## 2019-06-29 NOTE — DISCHARGE NOTE OB - CARE PROVIDER_API CALL
Rajiv Armstrong)  Obstetrics and Gynecology  [address], [address]  [place] [address]  Phone: [phone]  Fax: [phone]  Follow Up Time:

## 2019-06-29 NOTE — DISCHARGE NOTE OB - HOSPITAL COURSE
32 y/o female  who experienced rCS at 38 weeks. Labor course was complicated by PEC, delivery was uncomplicated. Postpartum course was unremarkable. Patient was transferred to postpartum floor and monitored.  Patient is medically optimized for discharge and is instructed to follow up at her OB-GYN in 4 days for incision check and in 6 weeks postpartum care. Patient verbalizes understanding and agreement with treatment and follow up plan and is satisfied with her care. At the time of discharge, patient was ambulating independently, tolerating PO intake, voiding and stooling appropriately, passing flatus and pain is well controlled.

## 2019-06-29 NOTE — DISCHARGE NOTE OB - MATERIALS PROVIDED
Breastfeeding Guide and Packet/Birth Certificate Instructions/Breastfeeding Log/Guide to Postpartum Care/Back To Sleep Handout/[facility]  Screening Program/Breastfeeding Mother’s Support Group Information/[facility] Hearing Screen Program/MMR Vaccination (VIS Pub Date: 2012)

## 2019-06-30 LAB — T PALLIDUM AB TITR SER: NEGATIVE — SIGNIFICANT CHANGE UP

## 2019-06-30 RX ORDER — IBUPROFEN 200 MG
600 TABLET ORAL EVERY 6 HOURS
Refills: 0 | Status: DISCONTINUED | OUTPATIENT
Start: 2019-06-30 | End: 2019-07-03

## 2019-06-30 RX ORDER — LABETALOL HCL 100 MG
200 TABLET ORAL EVERY 12 HOURS
Refills: 0 | Status: DISCONTINUED | OUTPATIENT
Start: 2019-06-30 | End: 2019-07-03

## 2019-06-30 RX ORDER — LABETALOL HCL 100 MG
300 TABLET ORAL EVERY 8 HOURS
Refills: 0 | Status: DISCONTINUED | OUTPATIENT
Start: 2019-06-30 | End: 2019-06-30

## 2019-06-30 RX ADMIN — Medication 975 MILLIGRAM(S): at 21:51

## 2019-06-30 RX ADMIN — Medication 325 MILLIGRAM(S): at 13:17

## 2019-06-30 RX ADMIN — Medication 975 MILLIGRAM(S): at 16:06

## 2019-06-30 RX ADMIN — Medication 30 MILLIGRAM(S): at 00:32

## 2019-06-30 RX ADMIN — Medication 30 MILLIGRAM(S): at 01:32

## 2019-06-30 RX ADMIN — Medication 250 MILLIGRAM(S): at 05:36

## 2019-06-30 RX ADMIN — Medication 200 MILLIGRAM(S): at 17:55

## 2019-06-30 RX ADMIN — Medication 100 MILLIGRAM(S): at 21:49

## 2019-06-30 RX ADMIN — Medication 325 MILLIGRAM(S): at 05:36

## 2019-06-30 RX ADMIN — Medication 975 MILLIGRAM(S): at 22:50

## 2019-06-30 RX ADMIN — Medication 100 MILLIGRAM(S): at 05:37

## 2019-06-30 RX ADMIN — Medication 30 MILLIGRAM(S): at 05:37

## 2019-06-30 RX ADMIN — Medication 325 MILLIGRAM(S): at 21:49

## 2019-06-30 RX ADMIN — Medication 250 MILLIGRAM(S): at 13:16

## 2019-06-30 RX ADMIN — Medication 30 MILLIGRAM(S): at 06:37

## 2019-06-30 RX ADMIN — LEVETIRACETAM 500 MILLIGRAM(S): 250 TABLET, FILM COATED ORAL at 00:31

## 2019-06-30 RX ADMIN — LEVETIRACETAM 500 MILLIGRAM(S): 250 TABLET, FILM COATED ORAL at 18:12

## 2019-06-30 RX ADMIN — LEVETIRACETAM 500 MILLIGRAM(S): 250 TABLET, FILM COATED ORAL at 05:37

## 2019-06-30 RX ADMIN — ENOXAPARIN SODIUM 40 MILLIGRAM(S): 100 INJECTION SUBCUTANEOUS at 21:50

## 2019-06-30 RX ADMIN — Medication 600 MILLIGRAM(S): at 13:17

## 2019-06-30 RX ADMIN — Medication 600 MILLIGRAM(S): at 18:43

## 2019-06-30 RX ADMIN — Medication 600 MILLIGRAM(S): at 17:55

## 2019-06-30 RX ADMIN — Medication 100 MILLIGRAM(S): at 13:17

## 2019-06-30 RX ADMIN — Medication 250 MILLIGRAM(S): at 21:48

## 2019-06-30 RX ADMIN — Medication 600 MILLIGRAM(S): at 14:15

## 2019-06-30 NOTE — OB PROVIDER DELIVERY SUMMARY - NSPROVIDERDELIVERYNOTE_OBGYN_ALL_OB_FT
Patient delivered viable infant via repeat low transverse  section, nuchal cord x2. Delayed cord clamping was performed. Placenta delivered intact and spontaneously. Hysterotomy closed in layers with excellent hemostasis. Bilateral salpingectomy performed. Abdomen closed in layers.   EBL 800cc. APGAR 9/9. Weight 6lb 9oz.

## 2019-06-30 NOTE — PROGRESS NOTE ADULT - PROBLEM SELECTOR PLAN 1
- change BP med to 200 mg BID, discussed with pt that may have to go up to TID if BP not well controlled  - continue Keppra for now, d/c at noon

## 2019-06-30 NOTE — CHART NOTE - NSCHARTNOTEFT_GEN_A_CORE
Patient doing well. Brought down for PP magnesium for superimposed PEC based on twice normal LFTs. However, once down on L&D BPs in the 110s-120s/70s-80s.   Decision made to hold magnesium sulfate and continue to monitor BPs. Currently on labetalol 300 BID, will maintain this dose for now. Will start on Keppra for seizure ppx per Dr. Armstrong.

## 2019-07-01 ENCOUNTER — OUTPATIENT (OUTPATIENT)
Dept: OUTPATIENT SERVICES | Facility: HOSPITAL | Age: 34
LOS: 1 days | End: 2019-07-01
Payer: MEDICAID

## 2019-07-01 DIAGNOSIS — Z98.890 OTHER SPECIFIED POSTPROCEDURAL STATES: Chronic | ICD-10-CM

## 2019-07-01 DIAGNOSIS — Z98.891 HISTORY OF UTERINE SCAR FROM PREVIOUS SURGERY: Chronic | ICD-10-CM

## 2019-07-01 DIAGNOSIS — Z87.39 PERSONAL HISTORY OF OTHER DISEASES OF THE MUSCULOSKELETAL SYSTEM AND CONNECTIVE TISSUE: Chronic | ICD-10-CM

## 2019-07-01 DIAGNOSIS — Z90.49 ACQUIRED ABSENCE OF OTHER SPECIFIED PARTS OF DIGESTIVE TRACT: Chronic | ICD-10-CM

## 2019-07-01 LAB
MEV IGG SER-ACNC: 24 AU/ML — SIGNIFICANT CHANGE UP
MEV IGG+IGM SER-IMP: NEGATIVE — SIGNIFICANT CHANGE UP

## 2019-07-01 PROCEDURE — G9001: CPT

## 2019-07-01 RX ADMIN — Medication 600 MILLIGRAM(S): at 17:58

## 2019-07-01 RX ADMIN — Medication 975 MILLIGRAM(S): at 03:24

## 2019-07-01 RX ADMIN — Medication 975 MILLIGRAM(S): at 09:07

## 2019-07-01 RX ADMIN — Medication 250 MILLIGRAM(S): at 21:36

## 2019-07-01 RX ADMIN — Medication 975 MILLIGRAM(S): at 03:54

## 2019-07-01 RX ADMIN — Medication 200 MILLIGRAM(S): at 17:58

## 2019-07-01 RX ADMIN — Medication 975 MILLIGRAM(S): at 10:00

## 2019-07-01 RX ADMIN — Medication 600 MILLIGRAM(S): at 06:24

## 2019-07-01 RX ADMIN — Medication 975 MILLIGRAM(S): at 21:35

## 2019-07-01 RX ADMIN — Medication 975 MILLIGRAM(S): at 22:13

## 2019-07-01 RX ADMIN — Medication 325 MILLIGRAM(S): at 21:36

## 2019-07-01 RX ADMIN — Medication 975 MILLIGRAM(S): at 15:30

## 2019-07-01 RX ADMIN — Medication 600 MILLIGRAM(S): at 01:00

## 2019-07-01 RX ADMIN — Medication 250 MILLIGRAM(S): at 05:24

## 2019-07-01 RX ADMIN — ENOXAPARIN SODIUM 40 MILLIGRAM(S): 100 INJECTION SUBCUTANEOUS at 21:36

## 2019-07-01 RX ADMIN — Medication 975 MILLIGRAM(S): at 16:20

## 2019-07-01 RX ADMIN — Medication 600 MILLIGRAM(S): at 13:00

## 2019-07-01 RX ADMIN — LEVETIRACETAM 500 MILLIGRAM(S): 250 TABLET, FILM COATED ORAL at 17:58

## 2019-07-01 RX ADMIN — Medication 325 MILLIGRAM(S): at 05:24

## 2019-07-01 RX ADMIN — Medication 600 MILLIGRAM(S): at 00:00

## 2019-07-01 RX ADMIN — Medication 600 MILLIGRAM(S): at 12:15

## 2019-07-01 RX ADMIN — LEVETIRACETAM 500 MILLIGRAM(S): 250 TABLET, FILM COATED ORAL at 05:23

## 2019-07-01 RX ADMIN — Medication 100 MILLIGRAM(S): at 05:24

## 2019-07-01 RX ADMIN — Medication 100 MILLIGRAM(S): at 21:36

## 2019-07-01 RX ADMIN — Medication 600 MILLIGRAM(S): at 18:13

## 2019-07-01 RX ADMIN — Medication 600 MILLIGRAM(S): at 05:24

## 2019-07-01 RX ADMIN — Medication 200 MILLIGRAM(S): at 05:24

## 2019-07-02 DIAGNOSIS — Z71.89 OTHER SPECIFIED COUNSELING: ICD-10-CM

## 2019-07-02 RX ORDER — DOCUSATE SODIUM 100 MG
1 CAPSULE ORAL
Qty: 90 | Refills: 0
Start: 2019-07-02 | End: 2019-07-31

## 2019-07-02 RX ORDER — IBUPROFEN 200 MG
1 TABLET ORAL
Qty: 56 | Refills: 0
Start: 2019-07-02 | End: 2019-07-15

## 2019-07-02 RX ORDER — LABETALOL HCL 100 MG
1 TABLET ORAL
Qty: 60 | Refills: 0
Start: 2019-07-02 | End: 2019-07-31

## 2019-07-02 RX ORDER — ASCORBIC ACID 60 MG
1 TABLET,CHEWABLE ORAL
Qty: 90 | Refills: 0
Start: 2019-07-02 | End: 2019-07-31

## 2019-07-02 RX ORDER — FUROSEMIDE 40 MG
1 TABLET ORAL
Qty: 0 | Refills: 0 | DISCHARGE

## 2019-07-02 RX ORDER — FERROUS SULFATE 325(65) MG
1 TABLET ORAL
Qty: 30 | Refills: 0
Start: 2019-07-02 | End: 2019-07-31

## 2019-07-02 RX ORDER — LABETALOL HCL 100 MG
1 TABLET ORAL
Qty: 0 | Refills: 0 | DISCHARGE

## 2019-07-02 RX ADMIN — Medication 100 MILLIGRAM(S): at 21:19

## 2019-07-02 RX ADMIN — Medication 975 MILLIGRAM(S): at 03:45

## 2019-07-02 RX ADMIN — Medication 600 MILLIGRAM(S): at 18:09

## 2019-07-02 RX ADMIN — Medication 100 MILLIGRAM(S): at 05:59

## 2019-07-02 RX ADMIN — LEVETIRACETAM 500 MILLIGRAM(S): 250 TABLET, FILM COATED ORAL at 05:58

## 2019-07-02 RX ADMIN — Medication 200 MILLIGRAM(S): at 05:58

## 2019-07-02 RX ADMIN — Medication 975 MILLIGRAM(S): at 09:02

## 2019-07-02 RX ADMIN — Medication 200 MILLIGRAM(S): at 18:18

## 2019-07-02 RX ADMIN — Medication 250 MILLIGRAM(S): at 21:17

## 2019-07-02 RX ADMIN — SIMETHICONE 80 MILLIGRAM(S): 80 TABLET, CHEWABLE ORAL at 16:06

## 2019-07-02 RX ADMIN — Medication 600 MILLIGRAM(S): at 18:39

## 2019-07-02 RX ADMIN — Medication 325 MILLIGRAM(S): at 21:18

## 2019-07-02 RX ADMIN — Medication 975 MILLIGRAM(S): at 09:32

## 2019-07-02 RX ADMIN — Medication 600 MILLIGRAM(S): at 05:59

## 2019-07-02 RX ADMIN — Medication 325 MILLIGRAM(S): at 16:05

## 2019-07-02 RX ADMIN — Medication 100 MILLIGRAM(S): at 16:07

## 2019-07-02 RX ADMIN — LEVETIRACETAM 500 MILLIGRAM(S): 250 TABLET, FILM COATED ORAL at 18:09

## 2019-07-02 RX ADMIN — SIMETHICONE 80 MILLIGRAM(S): 80 TABLET, CHEWABLE ORAL at 09:02

## 2019-07-02 RX ADMIN — Medication 325 MILLIGRAM(S): at 05:58

## 2019-07-02 RX ADMIN — ENOXAPARIN SODIUM 40 MILLIGRAM(S): 100 INJECTION SUBCUTANEOUS at 21:24

## 2019-07-02 RX ADMIN — Medication 975 MILLIGRAM(S): at 21:18

## 2019-07-02 RX ADMIN — Medication 250 MILLIGRAM(S): at 05:59

## 2019-07-02 RX ADMIN — Medication 975 MILLIGRAM(S): at 16:35

## 2019-07-02 RX ADMIN — Medication 975 MILLIGRAM(S): at 16:05

## 2019-07-02 RX ADMIN — Medication 600 MILLIGRAM(S): at 06:35

## 2019-07-02 RX ADMIN — Medication 600 MILLIGRAM(S): at 12:38

## 2019-07-02 RX ADMIN — Medication 975 MILLIGRAM(S): at 02:58

## 2019-07-02 RX ADMIN — Medication 250 MILLIGRAM(S): at 16:06

## 2019-07-02 RX ADMIN — Medication 600 MILLIGRAM(S): at 12:08

## 2019-07-02 RX ADMIN — Medication 975 MILLIGRAM(S): at 22:00

## 2019-07-03 VITALS
SYSTOLIC BLOOD PRESSURE: 131 MMHG | RESPIRATION RATE: 18 BRPM | TEMPERATURE: 99 F | DIASTOLIC BLOOD PRESSURE: 85 MMHG | HEART RATE: 73 BPM

## 2019-07-03 PROBLEM — Z34.80 PREGNANCY IN MULTIGRAVIDA: Status: RESOLVED | Noted: 2018-12-12 | Resolved: 2019-07-03

## 2019-07-03 PROBLEM — Z3A.37 37 WEEKS GESTATION OF PREGNANCY: Status: RESOLVED | Noted: 2019-06-26 | Resolved: 2019-07-03

## 2019-07-03 PROBLEM — O09.513 PRIMIGRAVIDA OF ADVANCED MATERNAL AGE IN THIRD TRIMESTER: Status: RESOLVED | Noted: 2019-05-22 | Resolved: 2019-07-03

## 2019-07-03 PROBLEM — Z34.93 THIRD TRIMESTER PREGNANCY: Status: RESOLVED | Noted: 2019-05-17 | Resolved: 2019-07-03

## 2019-07-03 PROBLEM — O09.529 ADVANCED MATERNAL AGE IN MULTIGRAVIDA: Status: RESOLVED | Noted: 2019-01-10 | Resolved: 2019-07-03

## 2019-07-03 RX ADMIN — Medication 600 MILLIGRAM(S): at 05:55

## 2019-07-03 RX ADMIN — Medication 100 MILLIGRAM(S): at 05:55

## 2019-07-03 RX ADMIN — Medication 250 MILLIGRAM(S): at 05:55

## 2019-07-03 RX ADMIN — LEVETIRACETAM 500 MILLIGRAM(S): 250 TABLET, FILM COATED ORAL at 05:55

## 2019-07-03 RX ADMIN — Medication 200 MILLIGRAM(S): at 05:55

## 2019-07-03 RX ADMIN — Medication 600 MILLIGRAM(S): at 00:04

## 2019-07-03 RX ADMIN — Medication 325 MILLIGRAM(S): at 05:55

## 2019-07-03 RX ADMIN — Medication 975 MILLIGRAM(S): at 10:16

## 2019-07-03 RX ADMIN — Medication 600 MILLIGRAM(S): at 00:30

## 2019-07-03 RX ADMIN — Medication 975 MILLIGRAM(S): at 11:15

## 2019-07-03 RX ADMIN — Medication 600 MILLIGRAM(S): at 06:22

## 2019-07-03 NOTE — PROGRESS NOTE ADULT - ASSESSMENT
Section Day POD 1  33y yo   s/p   She is doing well. Still needs to void after d/c hernandez.     Continue the current pain medication.  Encourage ambulation and regular diet.  DVT ppx: ambulation + SCD + lovenox  She is stable, tolerates a diet and has normal bowel function.
33y year old  now POD#2 s/p  section at 38wks gestation, complicated by cHTN.   - meeting all post partum and post op goals   - continue routine post op care  - encourage ambulation   - pain medications PRN   - will discuss discharge tomorrow
33y year old  now POD#3 s/p  section at 38wks gestation, complicated by cHTN.       - BLood pressures well controlled with Labetalol 200mg PO BID  - continue routine post op care  - encourage ambulation   - pain medications PRN   - will discuss discharge today
33y year old  now POD#4 s/p  section at 38wks gestation, in stable condition.   - blood pressures controlled  - meeting all post partum and post op milestones  - will discuss discharge today

## 2019-07-03 NOTE — PROGRESS NOTE ADULT - SUBJECTIVE AND OBJECTIVE BOX
She is a  33y  who is now post-operative day 1 from [facility] and BS    Type + Screen: O POS (19 @ 08:14)      Subjective:  The patient feels well currently. She doesn't want to take her labetalol 300 mg because she states it makes her feel dizzy and nauseous.   She is ambulating and tolerating a diet  She is having  + flatus - bowel movements.  She reports no breathing problems, headache or visual changes  She reports normal postpartum bleeding  Pain controlled with oral medications    Vital Signs Last 24 Hrs  T(C): 36.7 (2019 04:30), Max: 36.7 (2019 04:30)  T(F): 98 (2019 04:30), Max: 98 (2019 04:30)  HR: 84 (2019 05:35) (61 - 84)  BP: 115/63 (2019 05:35) (107/65 - 156/95)  RR: 18 (2019 04:30) (11 - 22)  SpO2: 96% (2019 04:30) (92% - 100%)    Physical exam:  Gen: well appearing, up and walking  Breast: non-engorged   Abdomen: Soft, nontender, no distension , firm uterine fundus, the incision is intact with small amount of serosanguinous discharge  Pelvic: Normal lochia noted.  Ext: No DVT signs, warm extremities.    LABS: pending AM labs           Allergies    Minocin (Hives)    Intolerances
33y year old  now POD#2 s/p  section at 38wks gestation, complicated by cHTN.      No acute overnight events. Pain well controlled.  Patient is ambulating, +voiding, +Flatus, -BM  Reports minimal lochia.     VS:   Vital Signs Last 24 Hrs  T(C): 36.9 (2019 05:31), Max: 36.9 (2019 05:31)  T(F): 98.4 (2019 05:31), Max: 98.4 (2019 05:31)  HR: 80 (2019 05:31) (72 - 80)  BP: 131/77 (2019 05:31) (109/71 - 131/77)  BP(mean): --  RR: 18 (2019 05:31) (18 - 20)  SpO2: --    Physical Exam:  General: NAD  Abdomen: soft, ND, firm fundus palpated at the umbilicus. Incision clean, dry, and intact.  Ext: nontender lower extremities, bilaterally.     Labs:                        11.7   7.48  )-----------( 218      ( 2019 19:26 )             35.5
33y year old  now POD#3 s/p  section at 38wks gestation, complicated by cHTN.      No acute overnight events. Patient is feeling well.   Pain well controlled.  Patient is ambulating, +voiding, +Flatus, -BM  Reports minimal lochia. Patient will start pumping since baby is working on latch.  Denies Headache, vision changes, SOB, CP or calf pain.     VS:   Vital Signs Last 24 Hrs  T(C): 36.6 (2019 21:30), Max: 36.7 (2019 09:06)  T(F): 97.9 (2019 21:30), Max: 98 (2019 09:06)  HR: 77 (2019 21:30) (72 - 77)  BP: 136/78 (2019 21:30) (130/85 - 136/78)  RR: 19 (2019 21:30) (18 - 19)  SpO2: 98% (2019 21:30) (98% - 98%)    Physical Exam:  General: NAD  Abdomen: soft, ND, firm fundus palpated at the umbilicus. Incision clean, dry, and intact with STERI STRIPS  Ext: nontender lower extremities, bilaterally.     Labs:                        11.7   7.48  )-----------( 218      ( 2019 19:26 )             35.5
33y year old  now POD#4 s/p  section at 38wks gestation, complicated by chronic HTN.      No acute overnight events. Pain well controlled.  Denies nausea, vomiting, dizziness, or trouble ambulating.   Patient is ambulating, +voiding, +Flatus, +BM  Reports minimal lochia.     VS:   Vital Signs Last 24 Hrs  T(C): 36.7 (2019 19:19), Max: 36.9 (2019 09:27)  T(F): 98.1 (2019 19:19), Max: 98.4 (2019 09:27)  HR: 66 (2019 19:19) (63 - 66)  BP: 133/83 (2019 09:27) (133/83 - 133/83)  RR: 20 (2019 19:19) (20 - 20)  SpO2: 98% (2019 19:19) (98% - 98%)    Physical Exam:  General: NAD  Abdomen: soft, ND, firm fundus palpated at the umbilicus. Incision clean, dry, and intact.  Ext: nontender lower extremities, bilaterally.     Labs:  H/H: 11.7/35.5

## 2019-07-06 LAB — SURGICAL PATHOLOGY STUDY: SIGNIFICANT CHANGE UP

## 2019-07-08 ENCOUNTER — APPOINTMENT (OUTPATIENT)
Dept: OBGYN | Facility: CLINIC | Age: 34
End: 2019-07-08
Payer: MEDICAID

## 2019-07-08 VITALS
BODY MASS INDEX: 41.55 KG/M2 | SYSTOLIC BLOOD PRESSURE: 142 MMHG | DIASTOLIC BLOOD PRESSURE: 100 MMHG | WEIGHT: 243.38 LBS | HEIGHT: 64 IN

## 2019-07-08 DIAGNOSIS — Z34.93 ENCOUNTER FOR SUPERVISION OF NORMAL PREGNANCY, UNSPECIFIED, THIRD TRIMESTER: ICD-10-CM

## 2019-07-08 DIAGNOSIS — Z34.80 ENCOUNTER FOR SUPERVISION OF OTHER NORMAL PREGNANCY, UNSPECIFIED TRIMESTER: ICD-10-CM

## 2019-07-08 DIAGNOSIS — O09.513 SUPERVISION OF ELDERLY PRIMIGRAVIDA, THIRD TRIMESTER: ICD-10-CM

## 2019-07-08 DIAGNOSIS — Z3A.37 37 WEEKS GESTATION OF PREGNANCY: ICD-10-CM

## 2019-07-08 DIAGNOSIS — O09.529 SUPERVISION OF ELDERLY MULTIGRAVIDA, UNSPECIFIED TRIMESTER: ICD-10-CM

## 2019-07-08 DIAGNOSIS — Z09 ENCOUNTER FOR FOLLOW-UP EXAMINATION AFTER COMPLETED TREATMENT FOR CONDITIONS OTHER THAN MALIGNANT NEOPLASM: ICD-10-CM

## 2019-07-08 PROCEDURE — 0503F POSTPARTUM CARE VISIT: CPT

## 2019-07-10 PROCEDURE — 86900 BLOOD TYPING SEROLOGIC ABO: CPT

## 2019-07-10 PROCEDURE — 59050 FETAL MONITOR W/REPORT: CPT

## 2019-07-10 PROCEDURE — 85027 COMPLETE CBC AUTOMATED: CPT

## 2019-07-10 PROCEDURE — 86850 RBC ANTIBODY SCREEN: CPT

## 2019-07-10 PROCEDURE — 88302 TISSUE EXAM BY PATHOLOGIST: CPT

## 2019-07-10 PROCEDURE — 86901 BLOOD TYPING SEROLOGIC RH(D): CPT

## 2019-07-10 PROCEDURE — 36415 COLL VENOUS BLD VENIPUNCTURE: CPT

## 2019-07-10 PROCEDURE — 86780 TREPONEMA PALLIDUM: CPT

## 2019-07-10 PROCEDURE — 80053 COMPREHEN METABOLIC PANEL: CPT

## 2019-07-10 PROCEDURE — 84156 ASSAY OF PROTEIN URINE: CPT

## 2019-07-10 PROCEDURE — 88304 TISSUE EXAM BY PATHOLOGIST: CPT

## 2019-07-10 PROCEDURE — 82570 ASSAY OF URINE CREATININE: CPT

## 2019-07-10 PROCEDURE — 86765 RUBEOLA ANTIBODY: CPT

## 2019-07-10 PROCEDURE — 86923 COMPATIBILITY TEST ELECTRIC: CPT

## 2019-08-12 ENCOUNTER — APPOINTMENT (OUTPATIENT)
Dept: OBGYN | Facility: CLINIC | Age: 34
End: 2019-08-12
Payer: MEDICAID

## 2019-08-12 VITALS
SYSTOLIC BLOOD PRESSURE: 131 MMHG | WEIGHT: 246 LBS | HEART RATE: 68 BPM | DIASTOLIC BLOOD PRESSURE: 85 MMHG | BODY MASS INDEX: 42 KG/M2 | HEIGHT: 64 IN

## 2019-08-12 DIAGNOSIS — N91.1 SECONDARY AMENORRHEA: ICD-10-CM

## 2019-08-12 PROCEDURE — 0503F POSTPARTUM CARE VISIT: CPT

## 2019-08-12 NOTE — COUNSELING
[Nutrition] : nutrition [Breast Self Exam] : breast self exam [Exercise] : exercise [Vitamins/Supplements] : vitamins/supplements [STD (testing, results, tx)] : STD (testing, results, tx) [Safe Sexual Practices] : safe sexual practices

## 2019-08-12 NOTE — PHYSICAL EXAM
[Awake] : awake [Alert] : alert [Mass] : no breast mass [Acute Distress] : no acute distress [Axillary LAD] : no axillary lymphadenopathy [Nipple Discharge] : no nipple discharge [Soft] : soft [Oriented x3] : oriented to person, place, and time [Tender] : non tender [Normal] : uterus [No Bleeding] : there was no active vaginal bleeding [Enlarged ___ wks] : enlarged [unfilled] ~Uweeks [Uterine Adnexae] : were not tender and not enlarged

## 2019-08-14 LAB
C TRACH RRNA SPEC QL NAA+PROBE: NOT DETECTED
HPV HIGH+LOW RISK DNA PNL CVX: NOT DETECTED
N GONORRHOEA RRNA SPEC QL NAA+PROBE: NOT DETECTED
SOURCE TP AMPLIFICATION: NORMAL

## 2019-08-15 LAB — CYTOLOGY CVX/VAG DOC THIN PREP: ABNORMAL

## 2019-11-27 PROBLEM — Z28.21 REFUSED INFLUENZA VACCINE: Status: ACTIVE | Noted: 2018-12-14

## 2020-06-16 NOTE — OB RN DELIVERY SUMMARY - NS_CORDBLDGASA_OBGYN_ALL_OB
[Follow-Up Visit] : a follow-up [Home] : at home, [unfilled] , at the time of the visit. [Medical Office: ([facility])___] : at the medical office located in  [Verbal consent obtained from patient] : the patient, [unfilled] [FreeTextEntry2] : KAT Both arterial and venous

## 2021-03-19 NOTE — OB PST NOTE - HIV: DATE, OB PROFILE
Roni CANO         06-Jun-2019

## 2022-03-01 NOTE — ASU DISCHARGE PLAN (ADULT/PEDIATRIC). - NOTIFY
[facility], 2ND FLR AFS  [address] [facility] [address]      Cherry Lentz :2009 MRN:[id number]    3/1/2022 Time Session Began: 12:20pm  Time Session Ended: 1:02pm    Adult Virtual: Due to COVID-19 precautions, this visit was performed via live interactive two-way Video visit with patient's verbal consent. Clinician Location:Home. Patient Location: Home.. Patient's identity was verified. The Consent for Treatment, which includes patient rights and the grievance procedure, was reviewed and signed by patient or legal representative as part of the pre check in process for their virtual appointment today. The Consent was reviewed verbally with the patient and/or legal representative by this provider and any concerns or questions were addressed. Information including the Missed Appointment Agreement, After Hours contact information, and Fee Schedule was sent to the patient via their secure patient portal for reference after the appointment.     Session Type:Therapy 38-52 minutes (65924)    Others Present: NA    Intervention: Cognitive, Supportive    Suicide/Homicide/Violence Ideation: No    If Yes, explain: NA    Current Outpatient Medications   Medication Sig   • Norgestimate-Ethinyl Estradiol 0.18/0.215/0.25 MG-25 MCG tablet Take 1 tablet by mouth daily.     No current facility-administered medications for this visit.       Change in Medication(s) Reported: N/A  If Yes, explain: NA    Patient/Family Education Provided: Yes  Patient/Family Displays Understanding: Yes    If No, explain: NA    Chief complaint in patient's own words: Patient feeling a lack of motivation and feeling down.     Progress Note containing chief complaint and symptoms/problems related to the complaint: Patient reported that she seen her father over he last week and reported that she was not open to seeing or speaking with him again due to her parents arguing  with each other the entire time.     (Data/Action/Response/Plan)      D:  Cherry participated in an behavioral health follow up visit today. Patient engaged in the session. Patient reported that she recently obtained a job and was able to make some lead way regarding online school. Patient reported not being excited about the upcoming changes. Patient reported not wanting to be around others and reported having and up and down relationship with her mother.     A:  Provided patient with support to build rapport.During the session writer spent time discussing her lack of motivation. Writer used some CBT and discussed how patient thoughts could be impacting her mood. Writer spent time working with patient on some coping skills that she can use as a part of improving her overall mood.     R:  Cherry was alert and oriented x4.  Eye contact was appropriate. Speech was normal in rate, tone, and volume.  Motor activity was unremarkable.  Thought process was future oriented and goal directed.  Cherry denied any suicidal or homicidal ideation.  Patient presented as calm and cooperative.  Mood appeared calm with congruent affect. Patient appeared to understand the information presented.     P:  Writer will plan to meet with patient in two weeks. Writer encouraged patient working on activating her behavior by tapping back into her hobby of make up.     Need for Community Resources Assessed: Yes    Resources Needed: No    If Yes, what resources: NA    Diagnosis: Persistent adjustment disorder with mixed anxiety and depressed mood  (primary encounter diagnosis)    Treatment Plan: Unchanged    Discharge Plan: N/A    Next Appointment: 3/15/2022  Shasta Lane MS, LMFT   Bleeding that does not stop

## 2023-03-16 ENCOUNTER — EMERGENCY (EMERGENCY)
Facility: HOSPITAL | Age: 38
LOS: 1 days | Discharge: HOME | End: 2023-03-16
Attending: EMERGENCY MEDICINE
Payer: COMMERCIAL

## 2023-03-16 VITALS
OXYGEN SATURATION: 100 % | SYSTOLIC BLOOD PRESSURE: 173 MMHG | DIASTOLIC BLOOD PRESSURE: 109 MMHG | TEMPERATURE: 98 F | RESPIRATION RATE: 18 BRPM | WEIGHT: 221.12 LBS | HEART RATE: 80 BPM

## 2023-03-16 VITALS
DIASTOLIC BLOOD PRESSURE: 91 MMHG | TEMPERATURE: 98 F | HEART RATE: 79 BPM | SYSTOLIC BLOOD PRESSURE: 158 MMHG | RESPIRATION RATE: 17 BRPM | OXYGEN SATURATION: 100 %

## 2023-03-16 DIAGNOSIS — Z90.49 ACQUIRED ABSENCE OF OTHER SPECIFIED PARTS OF DIGESTIVE TRACT: Chronic | ICD-10-CM

## 2023-03-16 DIAGNOSIS — Z98.890 OTHER SPECIFIED POSTPROCEDURAL STATES: Chronic | ICD-10-CM

## 2023-03-16 DIAGNOSIS — Z98.891 HISTORY OF UTERINE SCAR FROM PREVIOUS SURGERY: Chronic | ICD-10-CM

## 2023-03-16 DIAGNOSIS — Z87.39 PERSONAL HISTORY OF OTHER DISEASES OF THE MUSCULOSKELETAL SYSTEM AND CONNECTIVE TISSUE: Chronic | ICD-10-CM

## 2023-03-16 LAB
ALBUMIN SERPL ELPH-MCNC: 4.1 G/DL — SIGNIFICANT CHANGE UP (ref 3.3–5.2)
ALP SERPL-CCNC: 63 U/L — SIGNIFICANT CHANGE UP (ref 40–120)
ALT FLD-CCNC: 17 U/L — SIGNIFICANT CHANGE UP
ANION GAP SERPL CALC-SCNC: 11 MMOL/L — SIGNIFICANT CHANGE UP (ref 5–17)
AST SERPL-CCNC: 23 U/L — SIGNIFICANT CHANGE UP
BASOPHILS # BLD AUTO: 0.02 K/UL — SIGNIFICANT CHANGE UP (ref 0–0.2)
BASOPHILS NFR BLD AUTO: 0.3 % — SIGNIFICANT CHANGE UP (ref 0–2)
BILIRUB SERPL-MCNC: <0.2 MG/DL — LOW (ref 0.4–2)
BUN SERPL-MCNC: 9.7 MG/DL — SIGNIFICANT CHANGE UP (ref 8–20)
CALCIUM SERPL-MCNC: 9.3 MG/DL — SIGNIFICANT CHANGE UP (ref 8.4–10.5)
CHLORIDE SERPL-SCNC: 100 MMOL/L — SIGNIFICANT CHANGE UP (ref 96–108)
CO2 SERPL-SCNC: 29 MMOL/L — SIGNIFICANT CHANGE UP (ref 22–29)
CREAT SERPL-MCNC: 0.77 MG/DL — SIGNIFICANT CHANGE UP (ref 0.5–1.3)
D DIMER BLD IA.RAPID-MCNC: 201 NG/ML DDU — SIGNIFICANT CHANGE UP
EGFR: 101 ML/MIN/1.73M2 — SIGNIFICANT CHANGE UP
EOSINOPHIL # BLD AUTO: 0.11 K/UL — SIGNIFICANT CHANGE UP (ref 0–0.5)
EOSINOPHIL NFR BLD AUTO: 1.5 % — SIGNIFICANT CHANGE UP (ref 0–6)
GLUCOSE SERPL-MCNC: 90 MG/DL — SIGNIFICANT CHANGE UP (ref 70–99)
HCG SERPL-ACNC: <4 MIU/ML — SIGNIFICANT CHANGE UP
HCT VFR BLD CALC: 37.1 % — SIGNIFICANT CHANGE UP (ref 34.5–45)
HGB BLD-MCNC: 12.2 G/DL — SIGNIFICANT CHANGE UP (ref 11.5–15.5)
IMM GRANULOCYTES NFR BLD AUTO: 0.4 % — SIGNIFICANT CHANGE UP (ref 0–0.9)
LYMPHOCYTES # BLD AUTO: 1.81 K/UL — SIGNIFICANT CHANGE UP (ref 1–3.3)
LYMPHOCYTES # BLD AUTO: 25.5 % — SIGNIFICANT CHANGE UP (ref 13–44)
MCHC RBC-ENTMCNC: 26.8 PG — LOW (ref 27–34)
MCHC RBC-ENTMCNC: 32.9 GM/DL — SIGNIFICANT CHANGE UP (ref 32–36)
MCV RBC AUTO: 81.5 FL — SIGNIFICANT CHANGE UP (ref 80–100)
MONOCYTES # BLD AUTO: 0.45 K/UL — SIGNIFICANT CHANGE UP (ref 0–0.9)
MONOCYTES NFR BLD AUTO: 6.3 % — SIGNIFICANT CHANGE UP (ref 2–14)
NEUTROPHILS # BLD AUTO: 4.68 K/UL — SIGNIFICANT CHANGE UP (ref 1.8–7.4)
NEUTROPHILS NFR BLD AUTO: 66 % — SIGNIFICANT CHANGE UP (ref 43–77)
PLATELET # BLD AUTO: 328 K/UL — SIGNIFICANT CHANGE UP (ref 150–400)
POTASSIUM SERPL-MCNC: 4.1 MMOL/L — SIGNIFICANT CHANGE UP (ref 3.5–5.3)
POTASSIUM SERPL-SCNC: 4.1 MMOL/L — SIGNIFICANT CHANGE UP (ref 3.5–5.3)
PROT SERPL-MCNC: 7.5 G/DL — SIGNIFICANT CHANGE UP (ref 6.6–8.7)
RBC # BLD: 4.55 M/UL — SIGNIFICANT CHANGE UP (ref 3.8–5.2)
RBC # FLD: 16 % — HIGH (ref 10.3–14.5)
SODIUM SERPL-SCNC: 140 MMOL/L — SIGNIFICANT CHANGE UP (ref 135–145)
TROPONIN T SERPL-MCNC: <0.01 NG/ML — SIGNIFICANT CHANGE UP (ref 0–0.06)
WBC # BLD: 7.1 K/UL — SIGNIFICANT CHANGE UP (ref 3.8–10.5)
WBC # FLD AUTO: 7.1 K/UL — SIGNIFICANT CHANGE UP (ref 3.8–10.5)

## 2023-03-16 PROCEDURE — 93005 ELECTROCARDIOGRAM TRACING: CPT

## 2023-03-16 PROCEDURE — 84484 ASSAY OF TROPONIN QUANT: CPT

## 2023-03-16 PROCEDURE — 93010 ELECTROCARDIOGRAM REPORT: CPT

## 2023-03-16 PROCEDURE — 71045 X-RAY EXAM CHEST 1 VIEW: CPT | Mod: 26

## 2023-03-16 PROCEDURE — 36415 COLL VENOUS BLD VENIPUNCTURE: CPT

## 2023-03-16 PROCEDURE — 99285 EMERGENCY DEPT VISIT HI MDM: CPT | Mod: 25

## 2023-03-16 PROCEDURE — 71045 X-RAY EXAM CHEST 1 VIEW: CPT

## 2023-03-16 PROCEDURE — 84702 CHORIONIC GONADOTROPIN TEST: CPT

## 2023-03-16 PROCEDURE — 85025 COMPLETE CBC W/AUTO DIFF WBC: CPT

## 2023-03-16 PROCEDURE — 99285 EMERGENCY DEPT VISIT HI MDM: CPT

## 2023-03-16 PROCEDURE — 80053 COMPREHEN METABOLIC PANEL: CPT

## 2023-03-16 PROCEDURE — 85379 FIBRIN DEGRADATION QUANT: CPT

## 2023-03-16 RX ORDER — AMLODIPINE BESYLATE 2.5 MG/1
5 TABLET ORAL ONCE
Refills: 0 | Status: COMPLETED | OUTPATIENT
Start: 2023-03-16 | End: 2023-03-16

## 2023-03-16 RX ADMIN — AMLODIPINE BESYLATE 5 MILLIGRAM(S): 2.5 TABLET ORAL at 20:24

## 2023-03-16 NOTE — ED STATDOCS - OBJECTIVE STATEMENT
36 y/o female with PMHx of HTN on HCTZ presents to the ED c/o several hour long episode of chest discomfort radiating across to right arm that is self limiting and resolved, associated with SOB. Pt states mother in law just  from CA and blood clot, no cardiac hx in self or family, denies hx of DVT, no OCP, no leg pain, no other complaints

## 2023-03-16 NOTE — ED ADULT NURSE NOTE - OBJECTIVE STATEMENT
Pt AAOX4 with C/O chest pain that comes and goes. Level 7/10 at this time. Pt states this may be related to a recent death in her family.

## 2023-03-16 NOTE — ED STATDOCS - NSICDXPASTSURGICALHX_GEN_ALL_CORE_FT
PAST SURGICAL HISTORY:  H/O dilation and curettage     History of  section X3 , , 2013    History of cholecystectomy     History of knee problem right arthroscopy

## 2023-03-16 NOTE — ED STATDOCS - CLINICAL SUMMARY MEDICAL DECISION MAKING FREE TEXT BOX
36 y/o female with PMHx of HTN with atypical chest pain, no cardiac hx and only risk factor is HTN, pt notes recent stress from recent death of mother in law and attributed it to anxiety, but wanted to b checkded out, discussed utility of d-dimer with patient, pt would like labs, will obtain labs, CXR, EKG, reassess 36 y/o female with PMHx of HTN with atypical chest pain, no cardiac hx and only risk factor is HTN, pt notes recent stress from recent death of mother in law and attributed it to anxiety, but wanted to b checkded out, discussed utility of d-dimer with patient, pt would like labs, will obtain labs, CXR, EKG, reassess    pt feels better. w/u without acute findigns. neg cxr. return precautions. f/u cardiology.

## 2023-03-16 NOTE — ED ADULT NURSE REASSESSMENT NOTE - NS ED NURSE REASSESS COMMENT FT1
Pt D/C home in stable condition. ambulate without difficulty instructions given with returned verbal demonstrations

## 2023-03-16 NOTE — ED STATDOCS - PATIENT PORTAL LINK FT
You can access the FollowMyHealth Patient Portal offered by [facility] by registering at the following website: [email or web address]. By joining [employer]’s FollowMyHealth portal, you will also be able to view your health information using other applications (apps) compatible with our system.

## 2023-03-16 NOTE — ED STATDOCS - NSFOLLOWUPCLINICS_GEN_ALL_ED_FT
[facility]  Cardiology  [address], [address]  [place] [address]  Phone: [phone]  Fax:   Follow Up Time: 4-6 Days

## 2023-03-16 NOTE — ED STATDOCS - ATTENDING APP SHARED VISIT CONTRIBUTION OF CARE
Raven: I performed a face to face bedside interview with patient regarding history of present illness, review of symptoms and past medical history. I completed an independent physical exam and ordered tests/medications as needed.  I have discussed patient's plan of care with advanced care provider. The advanced care provider assisted in  executing the discussed plan. I was available for any questions or issues that may have arose during the execution of the plan of care.

## 2023-03-16 NOTE — ED STATDOCS - PHYSICAL EXAMINATION
Gen: No acute distress, non toxic  HEENT: Mucous membranes moist, pink conjunctivae, EOMI  CV: RRR, nl s1/s2.  Resp: CTAB, normal rate and effort  GI: Abdomen soft, NT, ND. No rebound, no guarding  : No CVAT  Neuro: A&O x 3, moving all 4 extremities, 5/5 strength throughout, normal sensation, no drift   MSK: No spine or joint tenderness to palpation. no calf tenderness, no lower extremity edema   Skin: No rashes. intact and perfused.

## 2023-03-16 NOTE — ED STATDOCS - NS ED ATTENDING STATEMENT MOD
This was a shared visit with the HARSH. I reviewed and verified the documentation and independently performed the documented: Attending Only

## 2023-03-16 NOTE — ED ADULT TRIAGE NOTE - CHIEF COMPLAINT QUOTE
Chest pain radiating to right arm with intermittent SOB at 1500 today.  Took 81mg asa. Pt states right arm feels painful and weak.  Hand grasp equal with no arm drift and no facial droop.

## 2023-05-11 ENCOUNTER — OFFICE (OUTPATIENT)
Dept: URBAN - METROPOLITAN AREA CLINIC 6 | Facility: CLINIC | Age: 38
Setting detail: OPHTHALMOLOGY
End: 2023-05-11
Payer: COMMERCIAL

## 2023-05-11 DIAGNOSIS — H01.001: ICD-10-CM

## 2023-05-11 DIAGNOSIS — H52.4: ICD-10-CM

## 2023-05-11 DIAGNOSIS — H01.004: ICD-10-CM

## 2023-05-11 PROBLEM — H52.7 REFRACTIVE ERROR: Status: ACTIVE | Noted: 2023-05-11

## 2023-05-11 PROCEDURE — 92015 DETERMINE REFRACTIVE STATE: CPT

## 2023-05-11 PROCEDURE — 92004 COMPRE OPH EXAM NEW PT 1/>: CPT

## 2023-05-11 ASSESSMENT — CONFRONTATIONAL VISUAL FIELD TEST (CVF)
OS_FINDINGS: FULL
OD_FINDINGS: FULL

## 2023-05-11 ASSESSMENT — LID EXAM ASSESSMENTS
OD_BLEPHARITIS: T
OS_BLEPHARITIS: T

## 2023-05-11 ASSESSMENT — TONOMETRY
OD_IOP_MMHG: 12
OS_IOP_MMHG: 10

## 2023-05-12 PROBLEM — Z01.00 ENCOUNTER FOR EXAMINATION OF EYES AND VISION WITHOUT ABNORMAL FINDINGS: Status: ACTIVE | Noted: 2023-05-11

## 2023-05-12 ASSESSMENT — KERATOMETRY
OD_K1POWER_DIOPTERS: 44.00
OS_K2POWER_DIOPTERS: 46.00
OD_K2POWER_DIOPTERS: 46.25
OS_AXISANGLE_DEGREES: 103
OS_K1POWER_DIOPTERS: 44.00
OD_AXISANGLE_DEGREES: 075
METHOD_AUTO_MANUAL: AUTO

## 2023-05-12 ASSESSMENT — VISUAL ACUITY
OS_BCVA: 20/20-2
OD_BCVA: 20/30-2

## 2023-05-12 ASSESSMENT — REFRACTION_MANIFEST
OD_ADD: +1.00
OS_CYLINDER: -0.75
OD_CYLINDER: -0.75
OS_ADD: +1.00
OD_AXIS: 165
OS_AXIS: 15
OS_SPHERE: -0.50
OD_SPHERE: -0.50

## 2023-05-12 ASSESSMENT — REFRACTION_AUTOREFRACTION
OS_SPHERE: -1.00
OD_SPHERE: -0.75
OS_AXIS: 011
OD_AXIS: 165
OS_CYLINDER: -2.00
OD_CYLINDER: -1.50

## 2023-05-12 ASSESSMENT — AXIALLENGTH_DERIVED
OS_AL: 23.3837
OD_AL: 23.3387
OS_AL: 23.8224
OD_AL: 23.5794

## 2023-05-12 ASSESSMENT — SPHEQUIV_DERIVED
OS_SPHEQUIV: -0.875
OD_SPHEQUIV: -0.875
OS_SPHEQUIV: -2
OD_SPHEQUIV: -1.5

## 2023-10-23 ENCOUNTER — APPOINTMENT (OUTPATIENT)
Dept: GASTROENTEROLOGY | Facility: CLINIC | Age: 38
End: 2023-10-23

## 2023-12-25 ENCOUNTER — NON-APPOINTMENT (OUTPATIENT)
Age: 38
End: 2023-12-25

## 2024-02-07 NOTE — OB RN PREOPERATIVE CHECKLIST - NS_PREOPMEDADMIN_OBGYN_ALL_OB
Yes, see eMAR Alert-The patient is alert, awake and responds to voice. The patient is oriented to time, place, and person. The triage nurse is able to obtain subjective information.

## 2024-02-14 ENCOUNTER — NON-APPOINTMENT (OUTPATIENT)
Age: 39
End: 2024-02-14

## 2024-04-08 ENCOUNTER — EMERGENCY (EMERGENCY)
Facility: HOSPITAL | Age: 39
LOS: 1 days | Discharge: HOME | End: 2024-04-08
Attending: EMERGENCY MEDICINE
Payer: COMMERCIAL

## 2024-04-08 VITALS
WEIGHT: 273.37 LBS | SYSTOLIC BLOOD PRESSURE: 187 MMHG | HEIGHT: 64 IN | HEART RATE: 84 BPM | TEMPERATURE: 98 F | RESPIRATION RATE: 20 BRPM | OXYGEN SATURATION: 98 % | DIASTOLIC BLOOD PRESSURE: 107 MMHG

## 2024-04-08 DIAGNOSIS — Z98.890 OTHER SPECIFIED POSTPROCEDURAL STATES: Chronic | ICD-10-CM

## 2024-04-08 DIAGNOSIS — Z98.891 HISTORY OF UTERINE SCAR FROM PREVIOUS SURGERY: Chronic | ICD-10-CM

## 2024-04-08 DIAGNOSIS — Z90.49 ACQUIRED ABSENCE OF OTHER SPECIFIED PARTS OF DIGESTIVE TRACT: Chronic | ICD-10-CM

## 2024-04-08 DIAGNOSIS — Z87.39 PERSONAL HISTORY OF OTHER DISEASES OF THE MUSCULOSKELETAL SYSTEM AND CONNECTIVE TISSUE: Chronic | ICD-10-CM

## 2024-04-08 PROCEDURE — 99282 EMERGENCY DEPT VISIT SF MDM: CPT

## 2024-04-08 PROCEDURE — 99283 EMERGENCY DEPT VISIT LOW MDM: CPT

## 2024-04-08 NOTE — ED PROVIDER NOTE - OBJECTIVE STATEMENT
pt with headache  hx htn off meds for a long time. Recently getting headaches and BP has been running high when she checks it  non smoker  no other symptoms  med hx htn  soc no cig or alc

## 2024-04-08 NOTE — ED PROVIDER NOTE - NSFOLLOWUPINSTRUCTIONS_ED_ALL_ED_FT
Please see your doctor and get re-started on your blood pressure medication  Today's readings were 187/107 and 162/99 and you are symptomatic at times

## 2024-04-08 NOTE — ED PROVIDER NOTE - NS ED ROS FT
Constitutional: no fever, no chills  Eyes: no pain, no redness, no visual changes.  ENMT: no ear pain, no hearing problems, no nasal congestion/drainage, no throat pain, no neck pain/stiffness  CV: no chest pain, no edema  Resp: no cough, no dyspnea  GI: no abdominal pain, no bloating, no constipation, no diarrhea, no nausea, no vomiting.  : no dysuria, no hematuria  MSK: no msk pain, no weakness  Skin: no lesions, and no rashes   Neuro: no LOC, + headache, no sensory deficits, and no weakness

## 2024-04-08 NOTE — ED PROVIDER NOTE - CLINICAL SUMMARY MEDICAL DECISION MAKING FREE TEXT BOX
pt with hx htn  now having HAs and high BP readings  no acute physical findings other than bp elevated  repeat bp improved  pt to go to PCP appt she has at 3 pm and have PCP restart her on meds

## 2024-04-08 NOTE — ED PROVIDER NOTE - PHYSICAL EXAMINATION
General: well appearing, NAD  Head:  NC, AT  Eyes: EOMI, no scleral icterus  Ears: no erythema/drainage  Nose: midline, no bleeding/drainage  Throat: MMM  Cardiac: RRR, no apparent murmurs, no lower extremity edema  Respiratory: CTABL, equal chest wall expansions  Abdomen: soft, ND, NT, no rebound, no guarding, nonperitonitic  MSK/Vascular: full ROM, soft compartments, warm extremities  Neuro: AAOx3,nl gait clear speech  Psych: calm, cooperative, normal affect

## 2024-04-08 NOTE — ED PROVIDER NOTE - PATIENT PORTAL LINK FT
You can access the FollowMyHealth Patient Portal offered by [facility] by registering at the following website: [email or web address]. By joining [email or web address]’s FollowMyHealth portal, you will also be able to view your health information using other applications (apps) compatible with our system.

## 2024-04-08 NOTE — ED ADULT TRIAGE NOTE - CHIEF COMPLAINT QUOTE
Pt arrives to ED c/o HA and " elevated b/p " readings at home. Pt recently was taken off one b/p medications , continue to take HCTZ

## 2024-07-02 NOTE — OB RN PATIENT PROFILE - HIV: DATE, OB PROFILE
Thank you very much for visiting us today. We will send blood work to check Ezequiel's immune system and will contact you when the results are available. We recommend obtaining these labs on a Monday to Wednesday late morning (not too early, but before 12) given the send-out nature of some of them and to please reinforce to the lab these labs need to be send STAT right away to our send-out lab. We will plan to see Ezequiel 3-4 weeks after the labs are collected, (highly recommend scheduling the follow up as soon as you can to avoid schedule blocks), but please feel free to contact us through our office at [phone] and press 0 to talk with our  for any scheduling needs or [phone] to talk with our nursing team if you have any earlier or additional clinical needs. It was a pleasure caring for Ezequiel today!    ==============================             
06-Jun-2019

## 2024-10-02 NOTE — OB PST NOTE - NS_ANESTHECONSULT_OBGYN_ALL_OB
Patient left without being seen due to the fact that the patient is unable to wait and found an appointment elsewhere.  
N/A

## 2024-11-01 ENCOUNTER — NON-APPOINTMENT (OUTPATIENT)
Age: 39
End: 2024-11-01

## 2024-11-06 ENCOUNTER — APPOINTMENT (OUTPATIENT)
Dept: OBGYN | Facility: CLINIC | Age: 39
End: 2024-11-06

## 2024-12-31 ENCOUNTER — EMERGENCY (EMERGENCY)
Facility: HOSPITAL | Age: 39
LOS: 1 days | Discharge: HOME | End: 2024-12-31
Attending: STUDENT IN AN ORGANIZED HEALTH CARE EDUCATION/TRAINING PROGRAM
Payer: COMMERCIAL

## 2024-12-31 VITALS
HEIGHT: 64 IN | SYSTOLIC BLOOD PRESSURE: 126 MMHG | TEMPERATURE: 98 F | RESPIRATION RATE: 20 BRPM | WEIGHT: 250 LBS | DIASTOLIC BLOOD PRESSURE: 80 MMHG | OXYGEN SATURATION: 100 % | HEART RATE: 74 BPM

## 2024-12-31 DIAGNOSIS — Z90.49 ACQUIRED ABSENCE OF OTHER SPECIFIED PARTS OF DIGESTIVE TRACT: Chronic | ICD-10-CM

## 2024-12-31 DIAGNOSIS — Z98.891 HISTORY OF UTERINE SCAR FROM PREVIOUS SURGERY: Chronic | ICD-10-CM

## 2024-12-31 DIAGNOSIS — Z87.39 PERSONAL HISTORY OF OTHER DISEASES OF THE MUSCULOSKELETAL SYSTEM AND CONNECTIVE TISSUE: Chronic | ICD-10-CM

## 2024-12-31 DIAGNOSIS — Z98.890 OTHER SPECIFIED POSTPROCEDURAL STATES: Chronic | ICD-10-CM

## 2024-12-31 LAB
ALBUMIN SERPL ELPH-MCNC: 3.8 G/DL — SIGNIFICANT CHANGE UP (ref 3.3–5.2)
ALP SERPL-CCNC: 101 U/L — SIGNIFICANT CHANGE UP (ref 40–120)
ALT FLD-CCNC: 26 U/L — SIGNIFICANT CHANGE UP
ANION GAP SERPL CALC-SCNC: 11 MMOL/L — SIGNIFICANT CHANGE UP (ref 5–17)
AST SERPL-CCNC: 21 U/L — SIGNIFICANT CHANGE UP
BASOPHILS # BLD AUTO: 0.02 K/UL — SIGNIFICANT CHANGE UP (ref 0–0.2)
BASOPHILS NFR BLD AUTO: 0.2 % — SIGNIFICANT CHANGE UP (ref 0–2)
BILIRUB SERPL-MCNC: 0.2 MG/DL — LOW (ref 0.4–2)
BUN SERPL-MCNC: 7.6 MG/DL — LOW (ref 8–20)
CALCIUM SERPL-MCNC: 8.7 MG/DL — SIGNIFICANT CHANGE UP (ref 8.4–10.5)
CHLORIDE SERPL-SCNC: 101 MMOL/L — SIGNIFICANT CHANGE UP (ref 96–108)
CO2 SERPL-SCNC: 28 MMOL/L — SIGNIFICANT CHANGE UP (ref 22–29)
CREAT SERPL-MCNC: 0.79 MG/DL — SIGNIFICANT CHANGE UP (ref 0.5–1.3)
EGFR: 96 ML/MIN/1.73M2 — SIGNIFICANT CHANGE UP
EOSINOPHIL # BLD AUTO: 1.08 K/UL — HIGH (ref 0–0.5)
EOSINOPHIL NFR BLD AUTO: 12 % — HIGH (ref 0–6)
GLUCOSE SERPL-MCNC: 81 MG/DL — SIGNIFICANT CHANGE UP (ref 70–99)
HCG SERPL-ACNC: <4 MIU/ML — SIGNIFICANT CHANGE UP
HCT VFR BLD CALC: 36.2 % — SIGNIFICANT CHANGE UP (ref 34.5–45)
HGB BLD-MCNC: 12 G/DL — SIGNIFICANT CHANGE UP (ref 11.5–15.5)
IMM GRANULOCYTES NFR BLD AUTO: 0.6 % — SIGNIFICANT CHANGE UP (ref 0–0.9)
LYMPHOCYTES # BLD AUTO: 2.33 K/UL — SIGNIFICANT CHANGE UP (ref 1–3.3)
LYMPHOCYTES # BLD AUTO: 25.8 % — SIGNIFICANT CHANGE UP (ref 13–44)
MCHC RBC-ENTMCNC: 26.5 PG — LOW (ref 27–34)
MCHC RBC-ENTMCNC: 33.1 G/DL — SIGNIFICANT CHANGE UP (ref 32–36)
MCV RBC AUTO: 79.9 FL — LOW (ref 80–100)
MONOCYTES # BLD AUTO: 0.41 K/UL — SIGNIFICANT CHANGE UP (ref 0–0.9)
MONOCYTES NFR BLD AUTO: 4.5 % — SIGNIFICANT CHANGE UP (ref 2–14)
NEUTROPHILS # BLD AUTO: 5.13 K/UL — SIGNIFICANT CHANGE UP (ref 1.8–7.4)
NEUTROPHILS NFR BLD AUTO: 56.9 % — SIGNIFICANT CHANGE UP (ref 43–77)
PLATELET # BLD AUTO: 313 K/UL — SIGNIFICANT CHANGE UP (ref 150–400)
POTASSIUM SERPL-MCNC: 3.3 MMOL/L — LOW (ref 3.5–5.3)
POTASSIUM SERPL-SCNC: 3.3 MMOL/L — LOW (ref 3.5–5.3)
PROT SERPL-MCNC: 7 G/DL — SIGNIFICANT CHANGE UP (ref 6.6–8.7)
RBC # BLD: 4.53 M/UL — SIGNIFICANT CHANGE UP (ref 3.8–5.2)
RBC # FLD: 16.7 % — HIGH (ref 10.3–14.5)
SODIUM SERPL-SCNC: 140 MMOL/L — SIGNIFICANT CHANGE UP (ref 135–145)
WBC # BLD: 9.02 K/UL — SIGNIFICANT CHANGE UP (ref 3.8–10.5)
WBC # FLD AUTO: 9.02 K/UL — SIGNIFICANT CHANGE UP (ref 3.8–10.5)

## 2024-12-31 PROCEDURE — 99284 EMERGENCY DEPT VISIT MOD MDM: CPT | Mod: 25

## 2024-12-31 PROCEDURE — 96374 THER/PROPH/DIAG INJ IV PUSH: CPT

## 2024-12-31 PROCEDURE — 36415 COLL VENOUS BLD VENIPUNCTURE: CPT

## 2024-12-31 PROCEDURE — 84702 CHORIONIC GONADOTROPIN TEST: CPT

## 2024-12-31 PROCEDURE — 80053 COMPREHEN METABOLIC PANEL: CPT

## 2024-12-31 PROCEDURE — 96375 TX/PRO/DX INJ NEW DRUG ADDON: CPT

## 2024-12-31 PROCEDURE — 99284 EMERGENCY DEPT VISIT MOD MDM: CPT

## 2024-12-31 PROCEDURE — 85025 COMPLETE CBC W/AUTO DIFF WBC: CPT

## 2024-12-31 RX ORDER — SODIUM CHLORIDE 9 MG/ML
1000 INJECTION, SOLUTION INTRAMUSCULAR; INTRAVENOUS; SUBCUTANEOUS ONCE
Refills: 0 | Status: COMPLETED | OUTPATIENT
Start: 2024-12-31 | End: 2024-12-31

## 2024-12-31 RX ORDER — ACETAMINOPHEN 80 MG/.8ML
1000 SOLUTION/ DROPS ORAL ONCE
Refills: 0 | Status: COMPLETED | OUTPATIENT
Start: 2024-12-31 | End: 2024-12-31

## 2024-12-31 RX ORDER — FAMOTIDINE 20 MG/1
20 TABLET, FILM COATED ORAL ONCE
Refills: 0 | Status: COMPLETED | OUTPATIENT
Start: 2024-12-31 | End: 2024-12-31

## 2024-12-31 RX ORDER — POTASSIUM CHLORIDE 600 MG/1
40 TABLET, FILM COATED, EXTENDED RELEASE ORAL ONCE
Refills: 0 | Status: COMPLETED | OUTPATIENT
Start: 2024-12-31 | End: 2024-12-31

## 2024-12-31 RX ORDER — ONDANSETRON 4 MG/1
4 TABLET ORAL ONCE
Refills: 0 | Status: COMPLETED | OUTPATIENT
Start: 2024-12-31 | End: 2024-12-31

## 2024-12-31 RX ORDER — ONDANSETRON 4 MG/1
1 TABLET ORAL
Qty: 12 | Refills: 0
Start: 2024-12-31 | End: 2025-01-03

## 2024-12-31 RX ADMIN — SODIUM CHLORIDE 1000 MILLILITER(S): 9 INJECTION, SOLUTION INTRAMUSCULAR; INTRAVENOUS; SUBCUTANEOUS at 10:23

## 2024-12-31 RX ADMIN — ACETAMINOPHEN 400 MILLIGRAM(S): 80 SOLUTION/ DROPS ORAL at 10:24

## 2024-12-31 RX ADMIN — POTASSIUM CHLORIDE 40 MILLIEQUIVALENT(S): 600 TABLET, FILM COATED, EXTENDED RELEASE ORAL at 12:19

## 2024-12-31 RX ADMIN — ACETAMINOPHEN 1000 MILLIGRAM(S): 80 SOLUTION/ DROPS ORAL at 11:55

## 2024-12-31 RX ADMIN — Medication 20 MILLIGRAM(S): at 10:24

## 2024-12-31 RX ADMIN — FAMOTIDINE 20 MILLIGRAM(S): 20 TABLET, FILM COATED ORAL at 10:25

## 2024-12-31 RX ADMIN — ONDANSETRON 4 MILLIGRAM(S): 4 TABLET ORAL at 10:24

## 2024-12-31 NOTE — ED PROVIDER NOTE - BIRTH SEX
Have her check with her pharmacy and see if they have the losartan available now, we can try switching her back to that since it did not give her any problems.   Female

## 2024-12-31 NOTE — ED PROVIDER NOTE - PATIENT PORTAL LINK FT
You can access the FollowMyHealth Patient Portal offered by [facility] by registering at the following website: [email or web address]. By joining [employer]’s FollowMyHealth portal, you will also be able to view your health information using other applications (apps) compatible with our system.

## 2024-12-31 NOTE — ED PROVIDER NOTE - PHYSICAL EXAMINATION
General-alert and oriented to person place and time, nontoxic appearing, pleasant cooperative, NAD  HEENT-normocephalic, atraumatic, NT to palp, EOMI, PERRLA,   Neck- supple, trach midline, No JVD, no LAD  Chest- Nt to palp, no reproducible pain  Cardio-s1,s2 present, regular rate and rhythm  Resp- talks in full sentences, symmetrical chest rise, CTA bilat, no evidence of wheezes, rhonchi noted  Abdomen- bowel sounds presnt in all 4 quadrants, soft, NT/ND, no guarding, no rebound tenderness  MSK- moves all extremities, able to ambulate without issues  Back- nt to palp of cervical, thoracic, lumbar spine, nt to palp of paraspinal m., No CVA tenderness  Neuro- no focal deficits, sensation intact

## 2024-12-31 NOTE — ED ADULT NURSE NOTE - OBJECTIVE STATEMENT
a&ox4 c/o generalized abd pain, +N/V/D x 3 days. + sick contacts + decreased appetite, PO intake.  denies further symptoms

## 2024-12-31 NOTE — ED ADULT TRIAGE NOTE - CHIEF COMPLAINT QUOTE
pt arrives to ED c/o N/V/D/abdominal pain, taking Imodium, Tylenol for pain, denies CP/SOB, history of Gallbladder removal

## 2024-12-31 NOTE — ED PROVIDER NOTE - CLINICAL SUMMARY MEDICAL DECISION MAKING FREE TEXT BOX
40 y/o female with hx of cholecystectomy presents to the ED c/o abdominal pain with nausea, vomiting and diarrhea for the past 5 days. 40 y/o female with hx of cholecystectomy presents to the ED c/o abdominal pain with nausea, vomiting and diarrhea for the past 5 days. Pt reassessed, pt feeling better at this time, vss, pt able to walk, talk and vocalized plan of action. Discussed in depth and explained to pt in depth the next steps that need to be taking including proper follow up with PCP or specialists. All incidental findings were discussed with pt as well. Pt verbalized their concerns and all questions were answered. Pt understands dispo and wants discharge. Given good instructions when to return to ED and importance of f/u. 38 y/o female with hx of cholecystectomy presents to the ED c/o abdominal pain with nausea, vomiting and diarrhea for the past 5 days. Pt reassessed, pt feeling better at this time, vss, pt able to walk, talk and vocalized plan of action. Discussed in depth and explained to pt in depth the next steps that need to be taking including proper follow up with PCP or specialists. All incidental findings were discussed with pt as well. Pt verbalized their concerns and all questions were answered. Pt understands dispo and wants discharge. Given good instructions when to return to ED and importance of f/u.  ---------  Leonor Lowe MD, Attending  38 y/o female with hx of cholecystectomy pw abd pain, nausea, vomiting and diarrhea for 5 days. Pt denies fever, chills, blood in vomit or stool, denies recent camping, abx use or sick contacts.   Gen: Well appearing in NAD   Head: NC/AT  Neck: trachea midline  Resp:  No distress  abd: non tender, non distended, no rebound guarding. + BS  Ext: no deformities  Neuro:  A&O appears non focal  Skin:  Warm and dry as visualized  Psych:  Normal affect and mood    ddx includes, but is not limited to the following: gastroenteritis, electrolyte derangement, lower suspicion for infectious diarrhea given no fever, blood in stool and non toxic appearance. .   plan: check electrolytes, GI PCR if pt is able to provide sample. IV hydration, antiemetic. reassessment for need for further labs, meds, or imaging.   update: see pa note above .

## 2024-12-31 NOTE — ED PROVIDER NOTE - OBJECTIVE STATEMENT
38 y/o female with hx of cholecystectomy presents to the ED c/o abdominal pain with nausea, vomiting and diarrhea for the past 5 days. Notes was seen by her pcp and advised likely viral. Notes pain crampy in nature with n/v/d. Notes no recent travels, surgery, hospitalizations, or abx use.

## 2024-12-31 NOTE — ED PROVIDER NOTE - CARE PROVIDER_API CALL
Floresita Peter  Gastroenterology  [address], [address]  [place] [address]  Phone: [phone]  Fax: [phone]  Follow Up Time:

## 2024-12-31 NOTE — ED PROVIDER NOTE - ATTENDING APP SHARED VISIT CONTRIBUTION OF CARE
Dr. Lowe: I personally saw the patient with the ACP and performed a substantive portion of the visit. I performed a face to face bedside interview with patient regarding history of present illness, review of symptoms and past medical history. I completed an independent physical exam and all aspects of medical decision making. I have discussed patient's plan of care with ACP. I agree with note as stated above, having amended the EMR as needed to reflect my findings. This includes HISTORY OF PRESENT ILLNESS, HIV, PAST MEDICAL/SURGICAL/FAMILY/SOCIAL HISTORY, ALLERGIES AND HOME MEDICATIONS, ROS, PHYSICAL EXAM, MEDICAL DECISION MAKING and any PROGRESS NOTES during the time I functioned as the attending physician for this patient.  SEE MDM

## 2024-12-31 NOTE — ED ADULT TRIAGE NOTE - ISOLATION TYPE:
Partially impaired: cannot see medication labels or newsprint, but can see obstacles in path, and the surrounding layout; can count fingers at arm's length
None

## 2025-05-13 NOTE — ED ADULT TRIAGE NOTE - HISTORY OF COVID-19 VACCINATION
Physical Therapy Treatment    Patient Name: Daryl Ferrara  MRN: [id number]  Encounter date: 5/13/2025    Time Calculation  Start Time: 1500  Stop Time: 1540  Time Calculation (min): 40 min  PT Therapeutic Procedures Time Entry  Therapeutic Exercise Time Entry: 40    Visit # 2 of 10  Visits/Dates Authorized: [employer] - NO AUTH / $30 copay / $1000 OOP not met / 40V ot/pt - 0 used / Availity [id number] / ds 5/7/25 // CPT 45112 Not covered. CPT 81798 requires authorization.    Current Problem:   Problem List Items Addressed This Visit           ICD-10-CM    Low back pain M54.50     Surgery:   Surgery date:     Precautions: Precautions  STEADI Fall Risk Score (The score of 4 or more indicates an increased risk of falling): 10   High fall risk, H/o chronic back pain, diabetes, HTN, neuropathy, OA          Subjective   General: Pt reports less symptomatic issue of sciatica today but pain is about 2-3 / 10 in L leg / thigh and L knee. Went out to the store to buy a volleyball for exercises.       Pre-Treatment Symptoms:    2-3/10    Objective   Cues for improved form with stretches and exercises.             Treatments:      Therapeutic Exercise 71949:   Nu-step x 4 min LE only    Stair stretching 3 reps alt as needed   calf stretching    slant board 2 min   HS stretch 30 sec holds   Hip flex lunge 30 sec holds    Following stretching at steps additional PT took over for rest of session as original PT started therapy later than scheduled time and pt was a few minutes late. During rest of session pt was educated and informed to improve form/technique for HEP. All exercises were performed as specified on papers from evaluation including reps and sets. Use of lime green band for resistance.   Piriformis   SKTC  And dynamic lumbar stabilization program performed and reviewed  Held nerve glide as pt performed prior to therapy session.    Assessment:  Pt had good tolerance to session demonstrating need for education to improve  Vaccine status unknown form and technique requiring supervision and cues throughout session for improved performance and safety. Pt reports pain at 2/10 following completion of session.         Post-Treatment Symptoms:    2/10    Plan: Cont to advance per pt tolerance.       Goals:   Active       PT Problem       PT Goal 1       Start:  05/08/25    Expected End:  07/31/25       1) Pt will report pain levels no greater than 1/10 at worst with activity for less difficulty standing, walking, lifting, and transferring.   2) Pt will display improved pain-free lumbar spine AROM WFL for less difficulty standing, walking, twisting, lifting, transferring.  3) Pt will score <20% impairment on Low Back Disability Questionnaire to indicate minimal low back dysfunction.  4) Pt will improve pain-free bilateral hip and knee strength to at least 4+/5 for all major muscle groups for improved functional strength with transferring, stairs, and general mobility.    5) Pt will displays improved steadiness on feet with least restrictive AD for improved mobility with transfers and walking.